# Patient Record
Sex: MALE | Race: WHITE | NOT HISPANIC OR LATINO | Employment: FULL TIME | URBAN - METROPOLITAN AREA
[De-identification: names, ages, dates, MRNs, and addresses within clinical notes are randomized per-mention and may not be internally consistent; named-entity substitution may affect disease eponyms.]

---

## 2017-03-11 ENCOUNTER — HOSPITAL ENCOUNTER (EMERGENCY)
Facility: HOSPITAL | Age: 47
Discharge: HOME | End: 2017-03-11
Attending: EMERGENCY MEDICINE | Admitting: EMERGENCY MEDICINE
Payer: COMMERCIAL

## 2017-03-11 VITALS
TEMPERATURE: 97.6 F | SYSTOLIC BLOOD PRESSURE: 211 MMHG | HEART RATE: 71 BPM | DIASTOLIC BLOOD PRESSURE: 98 MMHG | OXYGEN SATURATION: 100 % | RESPIRATION RATE: 16 BRPM | WEIGHT: 315 LBS

## 2017-03-11 DIAGNOSIS — R09.81 SINUS CONGESTION: Primary | ICD-10-CM

## 2017-03-11 PROCEDURE — 99283 EMERGENCY DEPT VISIT LOW MDM: CPT

## 2017-03-11 RX ORDER — FLUTICASONE PROPIONATE 50 MCG
1 SPRAY, SUSPENSION (ML) NASAL DAILY
Qty: 1 BOTTLE | Refills: 0 | Status: SHIPPED | OUTPATIENT
Start: 2017-03-11 | End: 2017-04-10

## 2017-03-11 RX ORDER — FEXOFENADINE HCL AND PSEUDOEPHEDRINE HCI 60; 120 MG/1; MG/1
1 TABLET, EXTENDED RELEASE ORAL EVERY 12 HOURS
Qty: 14 TABLET | Refills: 0 | Status: SHIPPED | OUTPATIENT
Start: 2017-03-11 | End: 2017-03-18

## 2017-03-11 RX ORDER — DIPHENHYDRAMINE HCL 25 MG
25 CAPSULE ORAL EVERY 6 HOURS PRN
Qty: 20 CAPSULE | Refills: 0 | Status: SHIPPED | OUTPATIENT
Start: 2017-03-11 | End: 2017-04-10

## 2020-11-21 ENCOUNTER — OFFICE VISIT (OUTPATIENT)
Dept: URGENT CARE | Age: 50
End: 2020-11-21
Payer: COMMERCIAL

## 2020-11-21 VITALS — TEMPERATURE: 98 F | HEART RATE: 108 BPM | OXYGEN SATURATION: 92 % | RESPIRATION RATE: 28 BRPM

## 2020-11-21 DIAGNOSIS — R06.02 SHORTNESS OF BREATH: Primary | ICD-10-CM

## 2020-11-21 PROCEDURE — U0003 INFECTIOUS AGENT DETECTION BY NUCLEIC ACID (DNA OR RNA); SEVERE ACUTE RESPIRATORY SYNDROME CORONAVIRUS 2 (SARS-COV-2) (CORONAVIRUS DISEASE [COVID-19]), AMPLIFIED PROBE TECHNIQUE, MAKING USE OF HIGH THROUGHPUT TECHNOLOGIES AS DESCRIBED BY CMS-2020-01-R: HCPCS | Performed by: NURSE PRACTITIONER

## 2020-11-21 PROCEDURE — G0382 LEV 3 HOSP TYPE B ED VISIT: HCPCS | Performed by: NURSE PRACTITIONER

## 2020-11-21 PROCEDURE — S9083 URGENT CARE CENTER GLOBAL: HCPCS | Performed by: NURSE PRACTITIONER

## 2020-11-21 RX ORDER — BENZONATATE 200 MG/1
200 CAPSULE ORAL 3 TIMES DAILY PRN
Qty: 20 CAPSULE | Refills: 0 | Status: SHIPPED | OUTPATIENT
Start: 2020-11-21

## 2020-11-21 RX ORDER — DEXAMETHASONE 4 MG/1
4 TABLET ORAL 2 TIMES DAILY WITH MEALS
Qty: 6 TABLET | Refills: 0 | Status: SHIPPED | OUTPATIENT
Start: 2020-11-21

## 2020-11-21 RX ORDER — ALBUTEROL SULFATE 90 UG/1
2 AEROSOL, METERED RESPIRATORY (INHALATION) EVERY 6 HOURS PRN
Qty: 8.5 G | Refills: 0 | Status: SHIPPED | OUTPATIENT
Start: 2020-11-21

## 2020-11-23 LAB — SARS-COV-2 RNA SPEC QL NAA+PROBE: DETECTED

## 2020-11-24 ENCOUNTER — TELEPHONE (OUTPATIENT)
Dept: URGENT CARE | Age: 50
End: 2020-11-24

## 2024-06-28 ENCOUNTER — HOSPITAL ENCOUNTER (INPATIENT)
Facility: HOSPITAL | Age: 54
LOS: 2 days | Discharge: HOME | DRG: 065 | End: 2024-06-30
Attending: EMERGENCY MEDICINE | Admitting: PSYCHIATRY & NEUROLOGY
Payer: COMMERCIAL

## 2024-06-28 ENCOUNTER — APPOINTMENT (EMERGENCY)
Dept: RADIOLOGY | Facility: HOSPITAL | Age: 54
DRG: 065 | End: 2024-06-28
Payer: COMMERCIAL

## 2024-06-28 DIAGNOSIS — I10 HYPERTENSION: ICD-10-CM

## 2024-06-28 DIAGNOSIS — E78.5 HYPERLIPIDEMIA: ICD-10-CM

## 2024-06-28 DIAGNOSIS — E11.9 T2DM (TYPE 2 DIABETES MELLITUS) (HCC): ICD-10-CM

## 2024-06-28 DIAGNOSIS — R53.1 WEAKNESS: ICD-10-CM

## 2024-06-28 DIAGNOSIS — G45.9 TIA (TRANSIENT ISCHEMIC ATTACK): ICD-10-CM

## 2024-06-28 DIAGNOSIS — I63.81 LACUNAR INFARCT, ACUTE (HCC): Primary | ICD-10-CM

## 2024-06-28 DIAGNOSIS — I65.29 CAROTID STENOSIS: ICD-10-CM

## 2024-06-28 LAB
ALBUMIN SERPL BCG-MCNC: 3.7 G/DL (ref 3.5–5)
ALP SERPL-CCNC: 45 U/L (ref 34–104)
ALT SERPL W P-5'-P-CCNC: 22 U/L (ref 7–52)
ANION GAP SERPL CALCULATED.3IONS-SCNC: 9 MMOL/L (ref 4–13)
AST SERPL W P-5'-P-CCNC: 26 U/L (ref 13–39)
BASOPHILS # BLD AUTO: 0.05 THOUSANDS/ÂΜL (ref 0–0.1)
BASOPHILS NFR BLD AUTO: 0 % (ref 0–1)
BILIRUB SERPL-MCNC: 0.28 MG/DL (ref 0.2–1)
BUN SERPL-MCNC: 21 MG/DL (ref 5–25)
CALCIUM SERPL-MCNC: 9.2 MG/DL (ref 8.4–10.2)
CHLORIDE SERPL-SCNC: 103 MMOL/L (ref 96–108)
CHOLEST SERPL-MCNC: 202 MG/DL
CO2 SERPL-SCNC: 25 MMOL/L (ref 21–32)
CREAT SERPL-MCNC: 1.31 MG/DL (ref 0.6–1.3)
EOSINOPHIL # BLD AUTO: 0.02 THOUSAND/ÂΜL (ref 0–0.61)
EOSINOPHIL NFR BLD AUTO: 0 % (ref 0–6)
ERYTHROCYTE [DISTWIDTH] IN BLOOD BY AUTOMATED COUNT: 13.2 % (ref 11.6–15.1)
EST. AVERAGE GLUCOSE BLD GHB EST-MCNC: 157 MG/DL
GFR SERPL CREATININE-BSD FRML MDRD: 61 ML/MIN/1.73SQ M
GLUCOSE SERPL-MCNC: 174 MG/DL (ref 65–140)
GLUCOSE SERPL-MCNC: 194 MG/DL (ref 65–140)
HBA1C MFR BLD: 7.1 %
HCT VFR BLD AUTO: 43.7 % (ref 36.5–49.3)
HDLC SERPL-MCNC: 43 MG/DL
HGB BLD-MCNC: 14.2 G/DL (ref 12–17)
IMM GRANULOCYTES # BLD AUTO: 0.15 THOUSAND/UL (ref 0–0.2)
IMM GRANULOCYTES NFR BLD AUTO: 1 % (ref 0–2)
LDLC SERPL CALC-MCNC: 134 MG/DL (ref 0–100)
LYMPHOCYTES # BLD AUTO: 4.22 THOUSANDS/ÂΜL (ref 0.6–4.47)
LYMPHOCYTES NFR BLD AUTO: 19 % (ref 14–44)
MCH RBC QN AUTO: 30.3 PG (ref 26.8–34.3)
MCHC RBC AUTO-ENTMCNC: 32.5 G/DL (ref 31.4–37.4)
MCV RBC AUTO: 93 FL (ref 82–98)
MONOCYTES # BLD AUTO: 1.48 THOUSAND/ÂΜL (ref 0.17–1.22)
MONOCYTES NFR BLD AUTO: 7 % (ref 4–12)
NEUTROPHILS # BLD AUTO: 16.69 THOUSANDS/ÂΜL (ref 1.85–7.62)
NEUTS SEG NFR BLD AUTO: 73 % (ref 43–75)
NRBC BLD AUTO-RTO: 0 /100 WBCS
PLATELET # BLD AUTO: 215 THOUSANDS/UL (ref 149–390)
PLATELET # BLD AUTO: 248 THOUSANDS/UL (ref 149–390)
PMV BLD AUTO: 10.6 FL (ref 8.9–12.7)
PMV BLD AUTO: 11.1 FL (ref 8.9–12.7)
POTASSIUM SERPL-SCNC: 4 MMOL/L (ref 3.5–5.3)
PROT SERPL-MCNC: 7.1 G/DL (ref 6.4–8.4)
RBC # BLD AUTO: 4.69 MILLION/UL (ref 3.88–5.62)
SODIUM SERPL-SCNC: 137 MMOL/L (ref 135–147)
TRIGL SERPL-MCNC: 124 MG/DL
WBC # BLD AUTO: 22.61 THOUSAND/UL (ref 4.31–10.16)

## 2024-06-28 PROCEDURE — 70498 CT ANGIOGRAPHY NECK: CPT

## 2024-06-28 PROCEDURE — 36415 COLL VENOUS BLD VENIPUNCTURE: CPT

## 2024-06-28 PROCEDURE — 80061 LIPID PANEL: CPT

## 2024-06-28 PROCEDURE — 70496 CT ANGIOGRAPHY HEAD: CPT

## 2024-06-28 PROCEDURE — 93005 ELECTROCARDIOGRAM TRACING: CPT

## 2024-06-28 PROCEDURE — 85049 AUTOMATED PLATELET COUNT: CPT

## 2024-06-28 PROCEDURE — 96360 HYDRATION IV INFUSION INIT: CPT

## 2024-06-28 PROCEDURE — 99285 EMERGENCY DEPT VISIT HI MDM: CPT | Performed by: EMERGENCY MEDICINE

## 2024-06-28 PROCEDURE — 83036 HEMOGLOBIN GLYCOSYLATED A1C: CPT

## 2024-06-28 PROCEDURE — 80053 COMPREHEN METABOLIC PANEL: CPT

## 2024-06-28 PROCEDURE — 82948 REAGENT STRIP/BLOOD GLUCOSE: CPT

## 2024-06-28 PROCEDURE — 99285 EMERGENCY DEPT VISIT HI MDM: CPT

## 2024-06-28 PROCEDURE — 85025 COMPLETE CBC W/AUTO DIFF WBC: CPT

## 2024-06-28 RX ORDER — ENOXAPARIN SODIUM 100 MG/ML
40 INJECTION SUBCUTANEOUS DAILY
Status: DISCONTINUED | OUTPATIENT
Start: 2024-06-29 | End: 2024-06-30 | Stop reason: HOSPADM

## 2024-06-28 RX ORDER — ATORVASTATIN CALCIUM 40 MG/1
40 TABLET, FILM COATED ORAL EVERY EVENING
Status: CANCELLED | OUTPATIENT
Start: 2024-06-28

## 2024-06-28 RX ORDER — ASPIRIN 81 MG/1
81 TABLET, CHEWABLE ORAL DAILY
Status: CANCELLED | OUTPATIENT
Start: 2024-06-29

## 2024-06-28 RX ORDER — DOCUSATE SODIUM 100 MG/1
100 CAPSULE, LIQUID FILLED ORAL 2 TIMES DAILY
Status: CANCELLED | OUTPATIENT
Start: 2024-06-28

## 2024-06-28 RX ORDER — ENOXAPARIN SODIUM 100 MG/ML
40 INJECTION SUBCUTANEOUS DAILY
Status: CANCELLED | OUTPATIENT
Start: 2024-06-29

## 2024-06-28 RX ORDER — ATORVASTATIN CALCIUM 40 MG/1
40 TABLET, FILM COATED ORAL EVERY EVENING
Status: DISCONTINUED | OUTPATIENT
Start: 2024-06-28 | End: 2024-06-30 | Stop reason: HOSPADM

## 2024-06-28 RX ORDER — CLOPIDOGREL BISULFATE 75 MG/1
75 TABLET ORAL DAILY
Status: DISCONTINUED | OUTPATIENT
Start: 2024-06-29 | End: 2024-06-30 | Stop reason: HOSPADM

## 2024-06-28 RX ORDER — CLOPIDOGREL BISULFATE 75 MG/1
300 TABLET ORAL ONCE
Status: COMPLETED | OUTPATIENT
Start: 2024-06-28 | End: 2024-06-28

## 2024-06-28 RX ORDER — ASPIRIN 81 MG/1
81 TABLET, CHEWABLE ORAL DAILY
Status: DISCONTINUED | OUTPATIENT
Start: 2024-06-29 | End: 2024-06-29

## 2024-06-28 RX ORDER — DOCUSATE SODIUM 100 MG/1
100 CAPSULE, LIQUID FILLED ORAL 2 TIMES DAILY
Status: DISCONTINUED | OUTPATIENT
Start: 2024-06-28 | End: 2024-06-30 | Stop reason: HOSPADM

## 2024-06-28 RX ORDER — ASPIRIN 325 MG
325 TABLET ORAL ONCE
Status: COMPLETED | OUTPATIENT
Start: 2024-06-28 | End: 2024-06-28

## 2024-06-28 RX ADMIN — IOHEXOL 85 ML: 350 INJECTION, SOLUTION INTRAVENOUS at 15:53

## 2024-06-28 RX ADMIN — ASPIRIN 325 MG ORAL TABLET 325 MG: 325 PILL ORAL at 17:15

## 2024-06-28 RX ADMIN — SODIUM CHLORIDE 1000 ML: 0.9 INJECTION, SOLUTION INTRAVENOUS at 16:12

## 2024-06-28 RX ADMIN — ATORVASTATIN CALCIUM 40 MG: 40 TABLET, FILM COATED ORAL at 20:22

## 2024-06-28 RX ADMIN — CLOPIDOGREL BISULFATE 300 MG: 75 TABLET ORAL at 20:22

## 2024-06-28 RX ADMIN — DOCUSATE SODIUM 100 MG: 100 CAPSULE, LIQUID FILLED ORAL at 20:22

## 2024-06-28 NOTE — QUICK NOTE
NEUROLOGY RESIDENCY   ADMISSION  QUICK NOTE   Name: Son Lafleur Age: 54 y.o. Sex:male  MRN: [id number]   Encounter: [id number] Length of Stay: 0    Patient seen and examined by the overnight resident for admission. Case was discussed with on-call Neurologist, Dr. Jeff, who is in agreement with the plan of care. Preliminary recommendations are as outlined below. Formal H&P to follow.     Recommendations for outpatient neurological follow up have yet to be determined.   Pending for discharge: Stroke Work Up    Assessment & Plan    ASSESSMENT & PLAN   # TRANSIENT NEUROLOGICAL DEFICITS  54-year-old male presenting after a transient episode of right-sided weakness and speech disturbance with total duration of 10 minutes. Upon arrival patient was asymptomatic with initial NIHSS of 0. Found to have an elevated initial BP of 169/141. Imaging consisting of CTA H/N revealed subacute to chronic right caudate and Td caudate white matter lacunar infarcts, mild cervical carotid atherosclerotic disease, severe tapering of the left supraclinoid ICA and markedly small carotid terminus as well as proximal CLAUDIA and MCA branches, severe focal stenosis at origin of the dominant right A1 segment, mild stenosis in the proximal right M1 segment, R >L PCA stenosis, and congenital and acquired cervical spinal stenosis with severe stenosis in the lower cervical spine.   - Home Antiplatelet or Anticoagulants PTA:  None  - Stroke risk factors: HTN and Over weight  - Prior stroke history: none. Past Neurological History: no neurological problems    Exam on 06/28/24: No focal deficits  BP over last 24 hours: Blood Pressure: 112/95  current BP: Blood Pressure: 112/95    Pertinent scores:  Initial NIHSS: 0  ABCD2 Score: 4 (BP, Unilateral Weakness, Duration of sxs)  Modified [place] Score: 0 (No baseline symptoms/disability)    Impression: Transient right-sided weakness and speech disturbances with a presentation consistent with TIA and an ABCD2  score of 4,which corresponds to a 9.8% 90-day stroke risk. For secondary stroke prevention, reasonable to treat with DAPT x21 days per CHANCE/POINT trials and high intensity statin.    Plan: Discussed plan with neurology attending, Dr. Jeff  Admivet along the stroke/TIA pathway with telemetry monitoring  Frequent neuro checks per protocol. If there is any acute changes in exam, please obtain stat CT head and notify neurology team  BP Goals: Normotension of <130/80  Labs: Hgb A1c, LDL   Brain Imaging: Obtain MRI Brain without contrast  TTE, pending  Antiplatelet agents:Load  mg, then 81 mg. Load Plavix 300  mg, then 75 mg. DAPT for 21 days- continue ASA 81mg indefinitely.  Statin: Will start atorvastin 40 mg daily  Euthermic/Euglycemic  DVT PPx: Lovenox, SCDs  Consults: Speech, PT/OT, PM&R and CM, input appreciated  Stroke education and counseling  Rest of other care per primary team appreciated      Subjective   CHIEF COMPLAINT     Chief Complaint   Patient presents with    Extremity Weakness     Patient reports eating lunch today and mid bite could not use right side of his body. Patient reports being unable to verbalize to coworkers what was going on. Boss stated patient had slurred speech. Patient symptoms resolved at this time.      HISTORY OF PRESENT ILLNESS   Hx/PE Limited By: None  HPI: 54-year-old male presenting after an episode of transient right-sided weakness. Patient states while at work he sat down to have lunch where he began experiencing weakness of the right arm and right leg (arm >leg). Also noted associated speech disturbance described as slurred speech and difficulty getting his words out. States that symptoms began abruptly. Total duration of 10 minutes. States that after episode completely returned back to his baseline. This is never happened in the past before. Denies any other associated symptoms.  REVIEW OF SYSTEMS   ROS: See HPI for details  HISTORICAL INFORMATION   Review of previous  "medical records was completed.   Past Medical History:  has no past medical history on file.   Allergies: Patient has no known allergies.  Past Surgical History:  has a past surgical history that includes Skin graft and Shoulder surgery.  Family History: family history is not on file.  Social History:  reports that he has never smoked. He has never used smokeless tobacco. He reports current alcohol use. He reports that he does not use drugs.     Objective   OBJECTIVE   Patient ID: Son Lafleur is a 54 y.o. male.  Blood pressure 112/95, pulse 75, temperature 97.9 °F (36.6 °C), temperature source Oral, resp. rate 17, height 5' 10\" (1.778 m), weight (!) 152 kg (335 lb), SpO2 98%.   GENERAL EXAM:  Constitutional: Not in acute distress. Not ill-appearing, toxic-appearing or diaphoretic.   HENT: Normocephalic and atraumatic. Nose and Ears normal.   Eyes: No scleral icterus. No discharge.   Cardiovascular:  Distal extremities warm without palpable edema or tenderness, no observed significant swelling.   Pulmonary: Pulmonary effort is normal. Not in respiratory distress  Musculoskeletal: No swelling or deformity.  Psychiatric: Normal behavior and appropriate affect     NEUROLOGIC  EXAM:  Mental Status: Alertness: alert, Orientation: time, date, person, place, city, president, Speech/Language fluent, clear, coherent, Commands: Can follow multistep commands  Cranial Nerves:  I Not tested   II Visual Fields normal   II, Ill,  IV, VI Pupils equal, round, reactive to light and accommodation  EOM full and intact   V facial sensation was normal and symmetrical   VII facial symmetry equal   VIII Normal hearing to speech   IX, X Normal Palatal Elevation, No Uvular Deviation   XI Shoulder shrug and head turn is normal   XII Midline tongue protrusion   Motor: No pronator drift, No atrophy or muscle wasting, Normal tone, No Involuntary Movements, and No tremors appreciated  Arm Right  Left Leg Right  Left   Deltoid 5/5 5/5 lliopsoas " "5/5 5/5   Biceps 5/5 5/5 Quads 5/5 5/5   Triceps 5/5 5/5 Hamstrings 5/5 5/5   Wrist Extension 5/5 5/5 Ankle Dorsi Flexion 5/5 5/5   Wrist Flexion 5/5 5/5 Ankle Plantar Flexion 5/5 5/5   Reflexes: No clonus, no Andino's, no cross abductors, toes down     BICEP   TRICEPS   BRACHIO   PATELLAR   ACHILLES   RIGHT 2+ 2+ 2+ 2+ 2+   LEFT 2+ 2+ 2+ 2+ 2+   Sensory: Normal sensation to light touch, pinprick, vibration, temperature, and proprioception in all limbs, romberg negative  Coordination: Cerebellar arm drift absent. Finger To Nose: Right Intact, Left Intact. Heel To Shin: Right Intact, Left Intact  Station/Gait: Deferred d/t medical severity    LABORATORY DATA     Labs: I have personally reviewed pertinent labs.    Results from last 7 days   Lab Units 06/28/24 2035 06/28/24  1456   WBC Thousand/uL  --  22.61*   HEMOGLOBIN g/dL  --  14.2   HEMATOCRIT %  --  43.7   PLATELETS Thousands/uL 215 248   SEGS PCT %  --  73   LYMPHO PCT %  --  19   MONO PCT %  --  7   EOS PCT %  --  0     Results from last 7 days   Lab Units 06/28/24  1456   SODIUM mmol/L 137   POTASSIUM mmol/L 4.0   CHLORIDE mmol/L 103   CO2 mmol/L 25   BUN mg/dL 21   CREATININE mg/dL 1.31*   ANION GAP mmol/L 9   CALCIUM mg/dL 9.2   ALBUMIN g/dL 3.7   TOTAL BILIRUBIN mg/dL 0.28   ALK PHOS U/L 45   ALT U/L 22   AST U/L 26   GLUCOSE RANDOM mg/dL 174*     Results from last 7 days   Lab Units 06/28/24  1415   POC GLUCOSE mg/dl 194*                 Microbiology: I have reviewed pertinent results.      No results found for: \"BLOODCX\", \"URINECX\", \"WOUNDCULT\", \"SPUTUMCULTUR\"    IMAGING STUDIES    Radiology Results: I have personally reviewed pertinent reports and reviewed films in PACS  CTA head and neck with and without contrast    Result Date: 6/28/2024  Impression: BRAIN: No acute intracranial hemorrhage or cortical infarction. Subacute to chronic right caudate and pericaudate white matter lacunar infarcts. CTA NECK: Mild cervical carotid atherosclerotic disease. " "No hemodynamically significant stenosis at the carotid bifurcations. CTA HEAD: Severe tapering of the left supraclinoid ICA and markedly small carotid terminus as well as proximal CLAUDIA and MCA branches. Severe focal stenosis at the origin of the dominant right A1 segment.. Mild stenosis in the proximal right M1 segment. Right greater than left PCA stenosis. MISCELLANEOUS: Congenital and acquired cervical spinal stenosis with severe stenosis in the lower cervical spine. Nonemergent follow-up MRI imaging is recommended. Workstation performed: [id number]         MEDICATIONS     Medications Prior to Admission:     albuterol (ProAir HFA) 90 mcg/act inhaler    benzonatate (TESSALON) 200 MG capsule    dexamethasone (DECADRON) 4 mg tablet    diphenhydrAMINE (BENADRYL) 25 mg capsule    fluticasone (FLONASE) 50 mcg/act nasal spray     Scheduled PRN   [START ON 6/29/2024] aspirin, 81 mg, Daily  atorvastatin, 40 mg, QPM  [START ON 6/29/2024] clopidogrel, 75 mg, Daily  docusate sodium, 100 mg, BID  [START ON 6/29/2024] enoxaparin, 40 mg, Daily         Continuous            VTE Mechanical Prophylaxis: Sequential Compression Device  VTE Pharmacologic Prophylaxis: VTE covered by:  [START ON 6/29/2024] enoxaparin, Subcutaneous      ====================================================================================  Code Status: Level 1 - Full Code  Advance Directive and Living Will:      Power of :    POLST:    =======================================================================  Dave Russ,    [facility]'s Neurology Residency, PGY-3    Portions of the record may have been created with voice recognition software.  Occasional wrong word or \"sound a like\" substitutions may have occurred due to the inherent limitations of voice recognition software.  Read the chart carefully and recognize, using context, where substitutions have occurred.   "

## 2024-06-28 NOTE — LETTER
[facility] [address]  [address] [address]  Dept: [phone]    June 30, 2024     Patient: Son Lafleur   YOB: 1970   Date of Visit: 6/28/2024       To Whom it May Concern:    Son Lafleur is under my professional care. He was seen in the hospital from 6/28/2024 to 06/30/24. He may return to work on 7/2/2024 without limitations.    If you have any questions or concerns, please don't hesitate to call.         Sincerely,        Salud Monteiro, DO

## 2024-06-28 NOTE — PLAN OF CARE
Problem: Neurological Deficit  Goal: Neurological status is stable or improving  Description: Interventions:  - Monitor and assess patient's level of consciousness, motor function, sensory function, and level of assistance needed for ADLs.   - Monitor and report changes from baseline. Collaborate with interdisciplinary team to initiate plan and implement interventions as ordered.   - Provide and maintain a safe environment.  - Consider seizure precautions.  - Consider fall precautions.  - Consider aspiration precautions.  - Consider bleeding precautions.  Outcome: Progressing     Problem: Activity Intolerance/Impaired Mobility  Goal: Mobility/activity is maintained at optimum level for patient  Description: Interventions:  - Assess and monitor patient  barriers to mobility and need for assistive/adaptive devices.  - Assess patient's emotional response to limitations.  - Collaborate with interdisciplinary team and initiate plans and interventions as ordered.  - Encourage independent activity per ability.  - Maintain proper body alignment.  - Perform active/passive rom as tolerated/ordered.  - Plan activities to conserve energy.  - Turn patient as appropriate  Outcome: Progressing     Problem: Communication Impairment  Goal: Ability to express needs and understand communication  Description: Assess patient's communication skills and ability to understand information.  Patient will demonstrate use of effective communication techniques, alternative methods of communication and understanding even if not able to speak.     - Encourage communication and provide alternate methods of communication as needed.  - Collaborate with case management/ for discharge needs.  - Include patient/family/caregiver in decisions related to communication.  Outcome: Progressing     Problem: Potential for Aspiration  Goal: Non-ventilated patient's risk of aspiration is minimized  Description: Assess and monitor vital signs,  respiratory status, and labs (WBC).  Monitor for signs of aspiration (tachypnea, cough, rales, wheezing, cyanosis, fever).    - Assess and monitor patient's ability to swallow.  - Place patient up in chair to eat if possible.  - HOB up at 90 degrees to eat if unable to get patient up into chair.  - Supervise patient during oral intake.   - Instruct patient/ family to take small bites.  - Instruct patient/ family to take small single sips when taking liquids.  - Follow patient-specific strategies generated by speech pathologist.  Outcome: Progressing  Goal: Ventilated patient's risk of aspiration is minimized  Description: Assess and monitor vital signs, respiratory status, airway cuff pressure, and labs (WBC).  Monitor for signs of aspiration (tachypnea, cough, rales, wheezing, cyanosis, fever).    - Elevate head of bed 30 degrees if patient has tube feeding.  - Monitor tube feeding.  Outcome: Progressing     Problem: Nutrition  Goal: Nutrition/Hydration status is improving  Description: Monitor and assess patient's nutrition/hydration status for malnutrition (ex- brittle hair, bruises, dry skin, pale skin and conjunctiva, muscle wasting, smooth red tongue, and disorientation). Collaborate with interdisciplinary team and initiate plan and interventions as ordered.  Monitor patient's weight and dietary intake as ordered or per policy. Utilize nutrition screening tool and intervene per policy. Determine patient's food preferences and provide high-protein, high-caloric foods as appropriate.     - Assist patient with eating.  - Allow adequate time for meals.  - Encourage patient to take dietary supplement as ordered.  - Collaborate with clinical nutritionist.  - Include patient/family/caregiver in decisions related to nutrition.  Outcome: Progressing     Problem: PAIN - ADULT  Goal: Verbalizes/displays adequate comfort level or baseline comfort level  Description: Interventions:  - Encourage patient to monitor pain and  request assistance  - Assess pain using appropriate pain scale  - Administer analgesics based on type and severity of pain and evaluate response  - Implement non-pharmacological measures as appropriate and evaluate response  - Consider cultural and social influences on pain and pain management  - Notify physician/advanced practitioner if interventions unsuccessful or patient reports new pain  Outcome: Progressing     Problem: INFECTION - ADULT  Goal: Absence or prevention of progression during hospitalization  Description: INTERVENTIONS:  - Assess and monitor for signs and symptoms of infection  - Monitor lab/diagnostic results  - Monitor all insertion sites, i.e. indwelling lines, tubes, and drains  - Monitor endotracheal if appropriate and nasal secretions for changes in amount and color  - [place] appropriate cooling/warming therapies per order  - Administer medications as ordered  - Instruct and encourage patient and family to use good hand hygiene technique  - Identify and instruct in appropriate isolation precautions for identified infection/condition  Outcome: Progressing  Goal: Absence of fever/infection during neutropenic period  Description: INTERVENTIONS:  - Monitor WBC    Outcome: Progressing     Problem: SAFETY ADULT  Goal: Patient will remain free of falls  Description: INTERVENTIONS:  - Educate patient/family on patient safety including physical limitations  - Instruct patient to call for assistance with activity   - Consult OT/PT to assist with strengthening/mobility   - Keep Call bell within reach  - Keep bed low and locked with side rails adjusted as appropriate  - Keep care items and personal belongings within reach  - Initiate and maintain comfort rounds  - Make Fall Risk Sign visible to staff  - Offer Toileting every 2 Hours, in advance of need  - Initiate/Maintain bed alarm  - Obtain necessary fall risk management equipment: bed alarm   - Apply yellow socks and bracelet for high fall risk  patients  - Consider moving patient to room near nurses station  Outcome: Progressing  Goal: Maintain or return to baseline ADL function  Description: INTERVENTIONS:  -  Assess patient's ability to carry out ADLs; assess patient's baseline for ADL function and identify physical deficits which impact ability to perform ADLs (bathing, care of mouth/teeth, toileting, grooming, dressing, etc.)  - Assess/evaluate cause of self-care deficits   - Assess range of motion  - Assess patient's mobility; develop plan if impaired  - Assess patient's need for assistive devices and provide as appropriate  - Encourage maximum independence but intervene and supervise when necessary  - Involve family in performance of ADLs  - Assess for home care needs following discharge   - Consider OT consult to assist with ADL evaluation and planning for discharge  - Provide patient education as appropriate  Outcome: Progressing  Goal: Maintains/Returns to pre admission functional level  Description: INTERVENTIONS:  - Perform AM-PAC 6 Click Basic Mobility/ Daily Activity assessment daily.  - Set and communicate daily mobility goal to care team and patient/family/caregiver.   - Collaborate with rehabilitation services on mobility goals if consulted  - Perform Range of Motion 3 times a day.  - Reposition patient every 2 hours.  - Dangle patient 3 times a day  - Stand patient 3 times a day  - Ambulate patient 3 times a day  - Out of bed to chair 3 times a day   - Out of bed for meals 3 times a day  - Out of bed for toileting  - Record patient progress and toleration of activity level   Outcome: Progressing     Problem: DISCHARGE PLANNING  Goal: Discharge to home or other facility with appropriate resources  Description: INTERVENTIONS:  - Identify barriers to discharge w/patient and caregiver  - Arrange for needed discharge resources and transportation as appropriate  - Identify discharge learning needs (meds, wound care, etc.)  - Arrange for  interpretive services to assist at discharge as needed  - Refer to Case Management Department for coordinating discharge planning if the patient needs post-hospital services based on physician/advanced practitioner order or complex needs related to functional status, cognitive ability, or social support system  Outcome: Progressing     Problem: Knowledge Deficit  Goal: Patient/family/caregiver demonstrates understanding of disease process, treatment plan, medications, and discharge instructions  Description: Complete learning assessment and assess knowledge base.  Interventions:  - Provide teaching at level of understanding  - Provide teaching via preferred learning methods  Outcome: Progressing     Problem: Nutrition/Hydration-ADULT  Goal: Nutrient/Hydration intake appropriate for improving, restoring or maintaining nutritional needs  Description: Monitor and assess patient's nutrition/hydration status for malnutrition. Collaborate with interdisciplinary team and initiate plan and interventions as ordered.  Monitor patient's weight and dietary intake as ordered or per policy. Utilize nutrition screening tool and intervene as necessary. Determine patient's food preferences and provide high-protein, high-caloric foods as appropriate.     INTERVENTIONS:  - Monitor oral intake, urinary output, labs, and treatment plans  - Assess nutrition and hydration status and recommend course of action  - Evaluate amount of meals eaten  - Assist patient with eating if necessary   - Allow adequate time for meals  - Recommend/ encourage appropriate diets, oral nutritional supplements, and vitamin/mineral supplements  - Order, calculate, and assess calorie counts as needed  - Recommend, monitor, and adjust tube feedings and TPN/PPN based on assessed needs  - Assess need for intravenous fluids  - Provide specific nutrition/hydration education as appropriate  - Include patient/family/caregiver in decisions related to nutrition  Outcome:  Progressing     Problem: NEUROSENSORY - ADULT  Goal: Achieves stable or improved neurological status  Description: INTERVENTIONS  - Monitor and report changes in neurological status  - Monitor vital signs such as temperature, blood pressure, glucose, and any other labs ordered   - Initiate measures to prevent increased intracranial pressure  - Monitor for seizure activity and implement precautions if appropriate      Outcome: Progressing     Problem: MUSCULOSKELETAL - ADULT  Goal: Maintain proper alignment of affected body part  Description: INTERVENTIONS:  - Support, maintain and protect limb and body alignment  - Provide patient/ family with appropriate education  Outcome: Progressing

## 2024-06-28 NOTE — ED PROVIDER NOTES
History  Chief Complaint   Patient presents with    Extremity Weakness     Patient reports eating lunch today and mid bite could not use right side of his body. Patient reports being unable to verbalize to coworkers what was going on. Boss stated patient had slurred speech. Patient symptoms resolved at this time.      54-year-old male presents emergency department complaining of right arm weakness that spontaneously resolved prior to arrival.  He states while at work today he did drop objects because his right arm was not working.  He did have an episode of dysarthria.  The symptoms are 100% resolved prior to the emergency department as a result stroke alert was not called.        Prior to Admission Medications   Prescriptions Last Dose Informant Patient Reported? Taking?   albuterol (ProAir HFA) 90 mcg/act inhaler Not Taking  No No   Sig: Inhale 2 puffs every 6 (six) hours as needed for shortness of breath   Patient not taking: Reported on 2024   benzonatate (TESSALON) 200 MG capsule Not Taking  No No   Sig: Take 1 capsule (200 mg total) by mouth 3 (three) times a day as needed for cough   Patient not taking: Reported on 2024   dexamethasone (DECADRON) 4 mg tablet Not Taking  No No   Sig: Take 1 tablet (4 mg total) by mouth 2 (two) times a day with meals   Patient not taking: Reported on 2024   diphenhydrAMINE (BENADRYL) 25 mg capsule   No No   Sig: Take 1 capsule by mouth every 6 (six) hours as needed for itching, allergies or sleep for up to 30 days   fluticasone (FLONASE) 50 mcg/act nasal spray   No No   Si spray into each nostril daily for 30 days      Facility-Administered Medications: None       History reviewed. No pertinent past medical history.    Past Surgical History:   Procedure Laterality Date    SHOULDER SURGERY      SKIN GRAFT         History reviewed. No pertinent family history.  I have reviewed and agree with the history as documented.    E-Cigarette/Vaping      E-Cigarette/Vaping Substances     Social History     Tobacco Use    Smoking status: Never    Smokeless tobacco: Never   Substance Use Topics    Alcohol use: Yes     Comment: rarely    Drug use: No        Review of Systems   Neurological:  Positive for weakness.   All other systems reviewed and are negative.      Physical Exam  ED Triage Vitals   Temperature Pulse Respirations Blood Pressure SpO2   06/28/24 2134 06/28/24 1408 06/28/24 1408 06/28/24 1408 06/28/24 1408   97.9 °F (36.6 °C) 73 17 (!) 169/141 98 %      Temp Source Heart Rate Source Patient Position - Orthostatic VS BP Location FiO2 (%)   06/28/24 1811 06/28/24 1408 06/28/24 1408 06/28/24 1408 --   Oral Monitor Lying Right arm       Pain Score       06/28/24 1811       No Pain             Orthostatic Vital Signs  Vitals:    06/28/24 1408 06/28/24 1430 06/28/24 1600 06/28/24 2134   BP: (!) 169/141 169/78 153/73 112/95   Pulse: 73 76 86 75   Patient Position - Orthostatic VS: Lying   Lying       Physical Exam  Vitals and nursing note reviewed.   Constitutional:       General: He is not in acute distress.     Appearance: He is well-developed.   HENT:      Head: Normocephalic and atraumatic.   Eyes:      Conjunctiva/sclera: Conjunctivae normal.   Cardiovascular:      Rate and Rhythm: Normal rate and regular rhythm.      Heart sounds: No murmur heard.  Pulmonary:      Effort: Pulmonary effort is normal. No respiratory distress.      Breath sounds: Normal breath sounds.   Abdominal:      Palpations: Abdomen is soft.      Tenderness: There is no abdominal tenderness.   Musculoskeletal:         General: No swelling.      Cervical back: Neck supple.   Skin:     General: Skin is warm and dry.      Capillary Refill: Capillary refill takes less than 2 seconds.   Neurological:      General: No focal deficit present.      Mental Status: He is alert and oriented to person, place, and time. Mental status is at baseline.      Cranial Nerves: No cranial nerve deficit.       Sensory: No sensory deficit.      Motor: No weakness.      Coordination: Coordination normal.      Gait: Gait normal.   Psychiatric:         Mood and Affect: Mood normal.         ED Medications  Medications   docusate sodium (COLACE) capsule 100 mg (100 mg Oral Given 6/28/24 2022)   atorvastatin (LIPITOR) tablet 40 mg (40 mg Oral Given 6/28/24 2022)   enoxaparin (LOVENOX) subcutaneous injection 40 mg (has no administration in time range)   clopidogrel (PLAVIX) tablet 75 mg (has no administration in time range)   aspirin chewable tablet 81 mg (has no administration in time range)   sodium chloride 0.9 % bolus 1,000 mL (1,000 mL Intravenous New Bag 6/28/24 1612)   iohexol (OMNIPAQUE) 350 MG/ML injection (SINGLE-DOSE) 85 mL (85 mL Intravenous Given 6/28/24 1553)   aspirin tablet 325 mg (325 mg Oral Given 6/28/24 1715)   clopidogrel (PLAVIX) tablet 300 mg (300 mg Oral Given 6/28/24 2022)       Diagnostic Studies  Results Reviewed       Procedure Component Value Units Date/Time    Hemoglobin A1c w/EAG Estimation [[id number]]  (Abnormal) Collected: 06/28/24 1456    Lab Status: Final result Specimen: Blood from Arm, Left Updated: 06/28/24 2321     Hemoglobin A1C 7.1 %       mg/dl     Lipid Panel with Direct LDL reflex [[id number]]  (Abnormal) Collected: 06/28/24 1456    Lab Status: Final result Specimen: Blood from Arm, Left Updated: 06/28/24 1913     Cholesterol 202 mg/dL      Triglycerides 124 mg/dL      HDL, Direct 43 mg/dL      LDL Calculated 134 mg/dL     Comprehensive metabolic panel [[id number]]  (Abnormal) Collected: 06/28/24 1456    Lab Status: Final result Specimen: Blood from Arm, Left Updated: 06/28/24 1531     Sodium 137 mmol/L      Potassium 4.0 mmol/L      Chloride 103 mmol/L      CO2 25 mmol/L      ANION GAP 9 mmol/L      BUN 21 mg/dL      Creatinine 1.31 mg/dL      Glucose 174 mg/dL      Calcium 9.2 mg/dL      AST 26 U/L      ALT 22 U/L      Alkaline Phosphatase 45 U/L      Total Protein 7.1 g/dL       Albumin 3.7 g/dL      Total Bilirubin 0.28 mg/dL      eGFR 61 ml/min/1.73sq m     Narrative:      [employer] guidelines for Chronic Kidney Disease (CKD):     Stage 1 with normal or high GFR (GFR > 90 mL/min/1.73 square meters)    Stage 2 Mild CKD (GFR = 60-89 mL/min/1.73 square meters)    Stage 3A Moderate CKD (GFR = 45-59 mL/min/1.73 square meters)    Stage 3B Moderate CKD (GFR = 30-44 mL/min/1.73 square meters)    Stage 4 Severe CKD (GFR = 15-29 mL/min/1.73 square meters)    Stage 5 End Stage CKD (GFR <15 mL/min/1.73 square meters)  Note: GFR calculation is accurate only with a steady state creatinine    CBC and differential [[id number]]  (Abnormal) Collected: 06/28/24 1456    Lab Status: Final result Specimen: Blood from Arm, Left Updated: 06/28/24 1509     WBC 22.61 Thousand/uL      RBC 4.69 Million/uL      Hemoglobin 14.2 g/dL      Hematocrit 43.7 %      MCV 93 fL      MCH 30.3 pg      MCHC 32.5 g/dL      RDW 13.2 %      MPV 11.1 fL      Platelets 248 Thousands/uL      nRBC 0 /100 WBCs      Segmented % 73 %      Immature Grans % 1 %      Lymphocytes % 19 %      Monocytes % 7 %      Eosinophils Relative 0 %      Basophils Relative 0 %      Absolute Neutrophils 16.69 Thousands/µL      Absolute Immature Grans 0.15 Thousand/uL      Absolute Lymphocytes 4.22 Thousands/µL      Absolute Monocytes 1.48 Thousand/µL      Eosinophils Absolute 0.02 Thousand/µL      Basophils Absolute 0.05 Thousands/µL     Fingerstick Glucose (POCT) [[id number]]  (Abnormal) Collected: 06/28/24 1415    Lab Status: Final result Specimen: Blood Updated: 06/28/24 1416     POC Glucose 194 mg/dl                    CTA head and neck with and without contrast   Final Result by Pallav N Shah, MD (06/28 1632)      BRAIN: No acute intracranial hemorrhage or cortical infarction. Subacute to chronic right caudate and pericaudate white matter lacunar infarcts.      CTA NECK: Mild cervical carotid atherosclerotic disease. No  hemodynamically significant stenosis at the carotid bifurcations.      CTA HEAD: Severe tapering of the left supraclinoid ICA and markedly small carotid terminus as well as proximal CLAUDIA and MCA branches. Severe focal stenosis at the origin of the dominant right A1 segment.. Mild stenosis in the proximal right M1 segment.    Right greater than left PCA stenosis.      MISCELLANEOUS: Congenital and acquired cervical spinal stenosis with severe stenosis in the lower cervical spine. Nonemergent follow-up MRI imaging is recommended.                              Workstation performed: [id number]         MRI Inpatient Order    (Results Pending)         Procedures  ECG 12 Lead Documentation Only    Date/Time: 6/28/2024 3:25 PM    Performed by: Christopher Thomas DO  Authorized by: Christopher Thomas DO    Indications / Diagnosis:  Stroke pathway  ECG reviewed by me, the ED Provider: yes    Patient location:  ED  Previous ECG:     Previous ECG:  Unavailable    Comparison to cardiac monitor: Yes    Interpretation:     Interpretation: normal    Rate:     ECG rate:  81    ECG rate assessment: normal    Rhythm:     Rhythm: sinus rhythm    Ectopy:     Ectopy: none    QRS:     QRS axis:  Normal    QRS intervals:  Normal  Conduction:     Conduction: normal    ST segments:     ST segments:  Normal  T waves:     T waves: normal          ED Course  ED Course as of 06/28/24 2331   Fri Jun 28, 2024   1442 R arm weakness and dysarthria that resolved prior to arrival. Happened about 30 minutes PTA    1536 Comprehensive metabolic panel(!)  Will order fluids. Pending CT scan    1649 CTA head and neck with and without contrast  IMPRESSION:     BRAIN: No acute intracranial hemorrhage or cortical infarction. Subacute to chronic right caudate and pericaudate white matter lacunar infarcts.     CTA NECK: Mild cervical carotid atherosclerotic disease. No hemodynamically significant stenosis at the carotid bifurcations.     CTA HEAD: Severe tapering of  the left supraclinoid ICA and markedly small carotid terminus as well as proximal CLAUDIA and MCA branches. Severe focal stenosis at the origin of the dominant right A1 segment.. Mild stenosis in the proximal right M1 segment.   Right greater than left PCA stenosis.     MISCELLANEOUS: Congenital and acquired cervical spinal stenosis with severe stenosis in the lower cervical spine. Nonemergent follow-up MRI imaging is recommended.                             Workstation performed: [id number]                                         Medical Decision Making  54-year-old male presents to the emergency department with presentation concerning for transient ischemic attack.  Given overall resolution of symptoms.,  Stroke alert will not be initiated however stroke workup will be performed in the emergency department.  Patient was informed he will be admitted for inpatient stroke workup as well as MRI.  CT scan was concerning for numerous tapering narrowing of multiple vessels.  Will receive aspirin and admission to neurology service.    Amount and/or Complexity of Data Reviewed  Labs: ordered. Decision-making details documented in ED Course.  Radiology: ordered. Decision-making details documented in ED Course.    Risk  OTC drugs.  Prescription drug management.  Decision regarding hospitalization.          Disposition  Final diagnoses:   Weakness   TIA (transient ischemic attack)   Lacunar infarct, acute (HCC)   Carotid stenosis     Time reflects when diagnosis was documented in both MDM as applicable and the Disposition within this note       Time User Action Codes Description Comment    6/28/2024  4:48 PM Christopher Thomas [R53.1] Weakness     6/28/2024  4:48 PM Christopher Thomas [G45.9] TIA (transient ischemic attack)     6/28/2024  4:48 PM Christopher Thomas [I63.81] Lacunar infarct, acute (HCC)     6/28/2024  4:49 PM Christopher Thomas [I65.29] Carotid stenosis     6/28/2024  5:36 PM Christopher Thomas Modify [R53.1]  Weakness     6/28/2024  5:36 PM Christopher Thomas Modify [G45.9] TIA (transient ischemic attack)           ED Disposition       ED Disposition   Admit    Condition   Stable    Date/Time   Fri Jun 28, 2024  5:29 PM    Comment   Case was discussed with Dr. Lake and the patient's admission status was agreed to be Admission Status: observation status to the service of Dr. Lake   .               Follow-up Information    None         Current Discharge Medication List        CONTINUE these medications which have NOT CHANGED    Details   albuterol (ProAir HFA) 90 mcg/act inhaler Inhale 2 puffs every 6 (six) hours as needed for shortness of breath  Qty: 8.5 g, Refills: 0    Comments: Substitution to a formulary equivalent within the same pharmaceutical class is authorized.  Associated Diagnoses: Shortness of breath      benzonatate (TESSALON) 200 MG capsule Take 1 capsule (200 mg total) by mouth 3 (three) times a day as needed for cough  Qty: 20 capsule, Refills: 0    Associated Diagnoses: Shortness of breath      dexamethasone (DECADRON) 4 mg tablet Take 1 tablet (4 mg total) by mouth 2 (two) times a day with meals  Qty: 6 tablet, Refills: 0    Associated Diagnoses: Shortness of breath      diphenhydrAMINE (BENADRYL) 25 mg capsule Take 1 capsule by mouth every 6 (six) hours as needed for itching, allergies or sleep for up to 30 days  Qty: 20 capsule, Refills: 0      fluticasone (FLONASE) 50 mcg/act nasal spray 1 spray into each nostril daily for 30 days  Qty: 1 Bottle, Refills: 0           No discharge procedures on file.    PDMP Review       None             ED Provider  Attending physically available and evaluated Son Lafleur. I managed the patient along with the ED Attending.    Electronically Signed by           Christopher Thomas DO  06/28/24 2835

## 2024-06-29 ENCOUNTER — APPOINTMENT (OUTPATIENT)
Dept: RADIOLOGY | Facility: HOSPITAL | Age: 54
DRG: 065 | End: 2024-06-29
Payer: COMMERCIAL

## 2024-06-29 PROBLEM — I10 HYPERTENSION: Status: ACTIVE | Noted: 2024-06-29

## 2024-06-29 PROBLEM — E11.9 T2DM (TYPE 2 DIABETES MELLITUS) (HCC): Status: ACTIVE | Noted: 2024-06-29

## 2024-06-29 PROBLEM — I63.9 STROKE (CEREBRUM) (HCC): Status: ACTIVE | Noted: 2024-06-29

## 2024-06-29 PROBLEM — E78.5 HYPERLIPIDEMIA: Status: ACTIVE | Noted: 2024-06-29

## 2024-06-29 LAB
ALBUMIN SERPL BCG-MCNC: 3.5 G/DL (ref 3.5–5)
ALP SERPL-CCNC: 35 U/L (ref 34–104)
ALT SERPL W P-5'-P-CCNC: 22 U/L (ref 7–52)
ANION GAP SERPL CALCULATED.3IONS-SCNC: 10 MMOL/L (ref 4–13)
AST SERPL W P-5'-P-CCNC: 23 U/L (ref 13–39)
BASOPHILS # BLD MANUAL: 0.14 THOUSAND/UL (ref 0–0.1)
BASOPHILS NFR MAR MANUAL: 1 % (ref 0–1)
BILIRUB SERPL-MCNC: 0.33 MG/DL (ref 0.2–1)
BUN SERPL-MCNC: 21 MG/DL (ref 5–25)
CALCIUM SERPL-MCNC: 8.8 MG/DL (ref 8.4–10.2)
CHLORIDE SERPL-SCNC: 105 MMOL/L (ref 96–108)
CO2 SERPL-SCNC: 25 MMOL/L (ref 21–32)
CREAT SERPL-MCNC: 1.07 MG/DL (ref 0.6–1.3)
EOSINOPHIL # BLD MANUAL: 0 THOUSAND/UL (ref 0–0.4)
EOSINOPHIL NFR BLD MANUAL: 0 % (ref 0–6)
ERYTHROCYTE [DISTWIDTH] IN BLOOD BY AUTOMATED COUNT: 13.3 % (ref 11.6–15.1)
GFR SERPL CREATININE-BSD FRML MDRD: 78 ML/MIN/1.73SQ M
GLUCOSE SERPL-MCNC: 119 MG/DL (ref 65–140)
GLUCOSE SERPL-MCNC: 130 MG/DL (ref 65–140)
GLUCOSE SERPL-MCNC: 140 MG/DL (ref 65–140)
GLUCOSE SERPL-MCNC: 163 MG/DL (ref 65–140)
GLUCOSE SERPL-MCNC: 194 MG/DL (ref 65–140)
HCT VFR BLD AUTO: 44.3 % (ref 36.5–49.3)
HGB BLD-MCNC: 14.6 G/DL (ref 12–17)
LYMPHOCYTES # BLD AUTO: 31 % (ref 14–44)
LYMPHOCYTES # BLD AUTO: 5.75 THOUSAND/UL (ref 0.6–4.47)
MCH RBC QN AUTO: 31 PG (ref 26.8–34.3)
MCHC RBC AUTO-ENTMCNC: 33 G/DL (ref 31.4–37.4)
MCV RBC AUTO: 94 FL (ref 82–98)
MONOCYTES # BLD AUTO: 0.56 THOUSAND/UL (ref 0–1.22)
MONOCYTES NFR BLD: 4 % (ref 4–12)
NEUTROPHILS # BLD MANUAL: 7.57 THOUSAND/UL (ref 1.85–7.62)
NEUTS SEG NFR BLD AUTO: 54 % (ref 43–75)
PLATELET # BLD AUTO: 227 THOUSANDS/UL (ref 149–390)
PLATELET BLD QL SMEAR: ADEQUATE
PMV BLD AUTO: 11 FL (ref 8.9–12.7)
POLYCHROMASIA BLD QL SMEAR: PRESENT
POTASSIUM SERPL-SCNC: 4.2 MMOL/L (ref 3.5–5.3)
PROT SERPL-MCNC: 6.7 G/DL (ref 6.4–8.4)
RBC # BLD AUTO: 4.71 MILLION/UL (ref 3.88–5.62)
RBC MORPH BLD: PRESENT
SODIUM SERPL-SCNC: 140 MMOL/L (ref 135–147)
VARIANT LYMPHS # BLD AUTO: 10 %
WBC # BLD AUTO: 14.02 THOUSAND/UL (ref 4.31–10.16)

## 2024-06-29 PROCEDURE — 99223 1ST HOSP IP/OBS HIGH 75: CPT | Performed by: PSYCHIATRY & NEUROLOGY

## 2024-06-29 PROCEDURE — 80053 COMPREHEN METABOLIC PANEL: CPT

## 2024-06-29 PROCEDURE — 97162 PT EVAL MOD COMPLEX 30 MIN: CPT

## 2024-06-29 PROCEDURE — 70551 MRI BRAIN STEM W/O DYE: CPT

## 2024-06-29 PROCEDURE — 82948 REAGENT STRIP/BLOOD GLUCOSE: CPT

## 2024-06-29 PROCEDURE — 85007 BL SMEAR W/DIFF WBC COUNT: CPT

## 2024-06-29 PROCEDURE — 97166 OT EVAL MOD COMPLEX 45 MIN: CPT

## 2024-06-29 PROCEDURE — 85027 COMPLETE CBC AUTOMATED: CPT

## 2024-06-29 RX ORDER — INSULIN LISPRO 100 [IU]/ML
2-12 INJECTION, SOLUTION INTRAVENOUS; SUBCUTANEOUS
Status: DISCONTINUED | OUTPATIENT
Start: 2024-06-29 | End: 2024-06-30 | Stop reason: HOSPADM

## 2024-06-29 RX ORDER — LORAZEPAM 2 MG/ML
2 INJECTION INTRAMUSCULAR ONCE AS NEEDED
Status: DISCONTINUED | OUTPATIENT
Start: 2024-06-29 | End: 2024-06-30 | Stop reason: HOSPADM

## 2024-06-29 RX ORDER — LORAZEPAM 2 MG/ML
1 INJECTION INTRAMUSCULAR ONCE AS NEEDED
Status: DISCONTINUED | OUTPATIENT
Start: 2024-06-29 | End: 2024-06-30 | Stop reason: HOSPADM

## 2024-06-29 RX ORDER — LABETALOL HYDROCHLORIDE 5 MG/ML
10 INJECTION, SOLUTION INTRAVENOUS EVERY 4 HOURS PRN
Status: DISCONTINUED | OUTPATIENT
Start: 2024-06-29 | End: 2024-06-30 | Stop reason: HOSPADM

## 2024-06-29 RX ORDER — OLANZAPINE 5 MG/1
5 TABLET, ORALLY DISINTEGRATING ORAL ONCE AS NEEDED
Status: COMPLETED | OUTPATIENT
Start: 2024-06-29 | End: 2024-06-30

## 2024-06-29 RX ORDER — AMLODIPINE BESYLATE 5 MG/1
5 TABLET ORAL DAILY
Status: DISCONTINUED | OUTPATIENT
Start: 2024-06-29 | End: 2024-06-29

## 2024-06-29 RX ORDER — ASPIRIN 81 MG/1
81 TABLET, CHEWABLE ORAL DAILY
Status: DISCONTINUED | OUTPATIENT
Start: 2024-06-30 | End: 2024-06-30 | Stop reason: HOSPADM

## 2024-06-29 RX ORDER — HYDRALAZINE HYDROCHLORIDE 20 MG/ML
5 INJECTION INTRAMUSCULAR; INTRAVENOUS EVERY 6 HOURS PRN
Status: DISCONTINUED | OUTPATIENT
Start: 2024-06-29 | End: 2024-06-30 | Stop reason: HOSPADM

## 2024-06-29 RX ORDER — AMLODIPINE BESYLATE 5 MG/1
5 TABLET ORAL ONCE
Status: COMPLETED | OUTPATIENT
Start: 2024-06-29 | End: 2024-06-29

## 2024-06-29 RX ORDER — LABETALOL HYDROCHLORIDE 5 MG/ML
10 INJECTION, SOLUTION INTRAVENOUS ONCE
Status: COMPLETED | OUTPATIENT
Start: 2024-06-29 | End: 2024-06-29

## 2024-06-29 RX ORDER — AMLODIPINE BESYLATE 10 MG/1
10 TABLET ORAL DAILY
Status: DISCONTINUED | OUTPATIENT
Start: 2024-06-30 | End: 2024-06-30 | Stop reason: HOSPADM

## 2024-06-29 RX ORDER — DIPHENHYDRAMINE HCL 25 MG
50 TABLET ORAL ONCE AS NEEDED
Status: COMPLETED | OUTPATIENT
Start: 2024-06-29 | End: 2024-06-29

## 2024-06-29 RX ORDER — LORAZEPAM 2 MG/ML
2 INJECTION INTRAMUSCULAR ONCE AS NEEDED
Status: COMPLETED | OUTPATIENT
Start: 2024-06-29 | End: 2024-06-29

## 2024-06-29 RX ORDER — LISINOPRIL 10 MG/1
10 TABLET ORAL DAILY
Status: DISCONTINUED | OUTPATIENT
Start: 2024-06-29 | End: 2024-06-30 | Stop reason: HOSPADM

## 2024-06-29 RX ADMIN — ENOXAPARIN SODIUM 40 MG: 40 INJECTION SUBCUTANEOUS at 08:46

## 2024-06-29 RX ADMIN — ATORVASTATIN CALCIUM 40 MG: 40 TABLET, FILM COATED ORAL at 16:20

## 2024-06-29 RX ADMIN — AMLODIPINE BESYLATE 5 MG: 5 TABLET ORAL at 08:47

## 2024-06-29 RX ADMIN — DIPHENHYDRAMINE HCL 50 MG: 25 TABLET ORAL at 13:30

## 2024-06-29 RX ADMIN — INSULIN LISPRO 2 UNITS: 100 INJECTION, SOLUTION INTRAVENOUS; SUBCUTANEOUS at 08:46

## 2024-06-29 RX ADMIN — LORAZEPAM 2 MG: 2 INJECTION INTRAMUSCULAR; INTRAVENOUS at 13:57

## 2024-06-29 RX ADMIN — ASPIRIN 81 MG CHEWABLE TABLET 81 MG: 81 TABLET CHEWABLE at 08:47

## 2024-06-29 RX ADMIN — DOCUSATE SODIUM 100 MG: 100 CAPSULE, LIQUID FILLED ORAL at 08:47

## 2024-06-29 RX ADMIN — CLOPIDOGREL BISULFATE 75 MG: 75 TABLET ORAL at 08:47

## 2024-06-29 RX ADMIN — LABETALOL HYDROCHLORIDE 10 MG: 5 INJECTION, SOLUTION INTRAVENOUS at 10:42

## 2024-06-29 RX ADMIN — AMLODIPINE BESYLATE 5 MG: 5 TABLET ORAL at 10:41

## 2024-06-29 RX ADMIN — HYDRALAZINE HYDROCHLORIDE 5 MG: 20 INJECTION INTRAMUSCULAR; INTRAVENOUS at 11:42

## 2024-06-29 RX ADMIN — LISINOPRIL 10 MG: 10 TABLET ORAL at 12:50

## 2024-06-29 RX ADMIN — LABETALOL HYDROCHLORIDE 10 MG: 5 INJECTION, SOLUTION INTRAVENOUS at 12:50

## 2024-06-29 NOTE — ASSESSMENT & PLAN NOTE
Lab Results   Component Value Date    HGBA1C 7.1 (H) 06/28/2024       Recent Labs     06/28/24  1415 06/29/24  0841 06/29/24  1040 06/29/24  1609   POCGLU 194* 194* 119 140       Blood Sugar Average: Last 72 hrs:  (P) 161.75    Upon discharge, will likely start Metformin IR 500mg BID and order glucose check supplies  While inpatient, Accuchecks and SSI TID AC.

## 2024-06-29 NOTE — SPEECH THERAPY NOTE
Speech Language/Pathology    Consult received in accordance with a stroke alert.  Pt reports his symptoms of slurred speech resolved. His brother was present during speech and swallow screen and he expresses that the pts speech is baseline.  The pt took sips from a alfonso coffee cup at 45* positioning in bed without overt s/s aspiration or penetration.  The pt and Nurse deny any difficulty with swallowing.  No speech/swallow therapy warranted at this time as pts symptoms have resolved. Will sign off. Reconsult as needed.

## 2024-06-29 NOTE — PLAN OF CARE
Problem: Neurological Deficit  Goal: Neurological status is stable or improving  Description: Interventions:  - Monitor and assess patient's level of consciousness, motor function, sensory function, and level of assistance needed for ADLs.   - Monitor and report changes from baseline. Collaborate with interdisciplinary team to initiate plan and implement interventions as ordered.   - Provide and maintain a safe environment.  - Consider seizure precautions.  - Consider fall precautions.  - Consider aspiration precautions.  - Consider bleeding precautions.  Outcome: Progressing     Problem: Activity Intolerance/Impaired Mobility  Goal: Mobility/activity is maintained at optimum level for patient  Description: Interventions:  - Assess and monitor patient  barriers to mobility and need for assistive/adaptive devices.  - Assess patient's emotional response to limitations.  - Collaborate with interdisciplinary team and initiate plans and interventions as ordered.  - Encourage independent activity per ability.  - Maintain proper body alignment.  - Perform active/passive rom as tolerated/ordered.  - Plan activities to conserve energy.  - Turn patient as appropriate  Outcome: Progressing     Problem: Communication Impairment  Goal: Ability to express needs and understand communication  Description: Assess patient's communication skills and ability to understand information.  Patient will demonstrate use of effective communication techniques, alternative methods of communication and understanding even if not able to speak.     - Encourage communication and provide alternate methods of communication as needed.  - Collaborate with case management/ for discharge needs.  - Include patient/family/caregiver in decisions related to communication.  Outcome: Progressing     Problem: Potential for Aspiration  Goal: Non-ventilated patient's risk of aspiration is minimized  Description: Assess and monitor vital signs,  respiratory status, and labs (WBC).  Monitor for signs of aspiration (tachypnea, cough, rales, wheezing, cyanosis, fever).    - Assess and monitor patient's ability to swallow.  - Place patient up in chair to eat if possible.  - HOB up at 90 degrees to eat if unable to get patient up into chair.  - Supervise patient during oral intake.   - Instruct patient/ family to take small bites.  - Instruct patient/ family to take small single sips when taking liquids.  - Follow patient-specific strategies generated by speech pathologist.  Outcome: Progressing  Goal: Ventilated patient's risk of aspiration is minimized  Description: Assess and monitor vital signs, respiratory status, airway cuff pressure, and labs (WBC).  Monitor for signs of aspiration (tachypnea, cough, rales, wheezing, cyanosis, fever).    - Elevate head of bed 30 degrees if patient has tube feeding.  - Monitor tube feeding.  Outcome: Progressing     Problem: Nutrition  Goal: Nutrition/Hydration status is improving  Description: Monitor and assess patient's nutrition/hydration status for malnutrition (ex- brittle hair, bruises, dry skin, pale skin and conjunctiva, muscle wasting, smooth red tongue, and disorientation). Collaborate with interdisciplinary team and initiate plan and interventions as ordered.  Monitor patient's weight and dietary intake as ordered or per policy. Utilize nutrition screening tool and intervene per policy. Determine patient's food preferences and provide high-protein, high-caloric foods as appropriate.     - Assist patient with eating.  - Allow adequate time for meals.  - Encourage patient to take dietary supplement as ordered.  - Collaborate with clinical nutritionist.  - Include patient/family/caregiver in decisions related to nutrition.  Outcome: Progressing     Problem: PAIN - ADULT  Goal: Verbalizes/displays adequate comfort level or baseline comfort level  Description: Interventions:  - Encourage patient to monitor pain and  request assistance  - Assess pain using appropriate pain scale  - Administer analgesics based on type and severity of pain and evaluate response  - Implement non-pharmacological measures as appropriate and evaluate response  - Consider cultural and social influences on pain and pain management  - Notify physician/advanced practitioner if interventions unsuccessful or patient reports new pain  Outcome: Progressing     Problem: INFECTION - ADULT  Goal: Absence or prevention of progression during hospitalization  Description: INTERVENTIONS:  - Assess and monitor for signs and symptoms of infection  - Monitor lab/diagnostic results  - Monitor all insertion sites, i.e. indwelling lines, tubes, and drains  - Monitor endotracheal if appropriate and nasal secretions for changes in amount and color  - [place] appropriate cooling/warming therapies per order  - Administer medications as ordered  - Instruct and encourage patient and family to use good hand hygiene technique  - Identify and instruct in appropriate isolation precautions for identified infection/condition  Outcome: Progressing  Goal: Absence of fever/infection during neutropenic period  Description: INTERVENTIONS:  - Monitor WBC    Outcome: Progressing     Problem: SAFETY ADULT  Goal: Patient will remain free of falls  Description: INTERVENTIONS:  - Educate patient/family on patient safety including physical limitations  - Instruct patient to call for assistance with activity   - Consult OT/PT to assist with strengthening/mobility   - Keep Call bell within reach  - Keep bed low and locked with side rails adjusted as appropriate  - Keep care items and personal belongings within reach  - Initiate and maintain comfort rounds  - Make Fall Risk Sign visible to staff  - Offer Toileting every 2 Hours, in advance of need  - Initiate/Maintain bed alarm  - Obtain necessary fall risk management equipment:   - Apply yellow socks and bracelet for high fall risk patients  -  Consider moving patient to room near nurses station  Outcome: Progressing  Goal: Maintain or return to baseline ADL function  Description: INTERVENTIONS:  -  Assess patient's ability to carry out ADLs; assess patient's baseline for ADL function and identify physical deficits which impact ability to perform ADLs (bathing, care of mouth/teeth, toileting, grooming, dressing, etc.)  - Assess/evaluate cause of self-care deficits   - Assess range of motion  - Assess patient's mobility; develop plan if impaired  - Assess patient's need for assistive devices and provide as appropriate  - Encourage maximum independence but intervene and supervise when necessary  - Involve family in performance of ADLs  - Assess for home care needs following discharge   - Consider OT consult to assist with ADL evaluation and planning for discharge  - Provide patient education as appropriate  Outcome: Progressing    Problem: DISCHARGE PLANNING  Goal: Discharge to home or other facility with appropriate resources  Description: INTERVENTIONS:  - Identify barriers to discharge w/patient and caregiver  - Arrange for needed discharge resources and transportation as appropriate  - Identify discharge learning needs (meds, wound care, etc.)  - Arrange for interpretive services to assist at discharge as needed  - Refer to Case Management Department for coordinating discharge planning if the patient needs post-hospital services based on physician/advanced practitioner order or complex needs related to functional status, cognitive ability, or social support system  Outcome: Progressing     Problem: Knowledge Deficit  Goal: Patient/family/caregiver demonstrates understanding of disease process, treatment plan, medications, and discharge instructions  Description: Complete learning assessment and assess knowledge base.  Interventions:  - Provide teaching at level of understanding  - Provide teaching via preferred learning methods  Outcome: Progressing      Problem: Nutrition/Hydration-ADULT  Goal: Nutrient/Hydration intake appropriate for improving, restoring or maintaining nutritional needs  Description: Monitor and assess patient's nutrition/hydration status for malnutrition. Collaborate with interdisciplinary team and initiate plan and interventions as ordered.  Monitor patient's weight and dietary intake as ordered or per policy. Utilize nutrition screening tool and intervene as necessary. Determine patient's food preferences and provide high-protein, high-caloric foods as appropriate.     INTERVENTIONS:  - Monitor oral intake, urinary output, labs, and treatment plans  - Assess nutrition and hydration status and recommend course of action  - Evaluate amount of meals eaten  - Assist patient with eating if necessary   - Allow adequate time for meals  - Recommend/ encourage appropriate diets, oral nutritional supplements, and vitamin/mineral supplements  - Order, calculate, and assess calorie counts as needed  - Recommend, monitor, and adjust tube feedings and TPN/PPN based on assessed needs  - Assess need for intravenous fluids  - Provide specific nutrition/hydration education as appropriate  - Include patient/family/caregiver in decisions related to nutrition  Outcome: Progressing     Problem: NEUROSENSORY - ADULT  Goal: Achieves stable or improved neurological status  Description: INTERVENTIONS  - Monitor and report changes in neurological status  - Monitor vital signs such as temperature, blood pressure, glucose, and any other labs ordered   - Initiate measures to prevent increased intracranial pressure  - Monitor for seizure activity and implement precautions if appropriate      Outcome: Progressing  Goal: Remains free of injury related to seizures activity  Description: INTERVENTIONS  - Maintain airway, patient safety  and administer oxygen as ordered  - Monitor patient for seizure activity, document and report duration and description of seizure to  physician/advanced practitioner  - If seizure occurs,  ensure patient safety during seizure  - Reorient patient post seizure  - Seizure pads on all 4 side rails  - Instruct patient/family to notify RN of any seizure activity including if an aura is experienced  - Instruct patient/family to call for assistance with activity based on nursing assessment  - Administer anti-seizure medications if ordered    Outcome: Progressing  Goal: Achieves maximal functionality and self care  Description: INTERVENTIONS  - Monitor swallowing and airway patency with patient fatigue and changes in neurological status  - Encourage and assist patient to increase activity and self care.   - Encourage visually impaired, hearing impaired and aphasic patients to use assistive/communication devices  Outcome: Progressing     Problem: MUSCULOSKELETAL - ADULT  Goal: Maintain or return mobility to safest level of function  Description: INTERVENTIONS:  - Assess patient's ability to carry out ADLs; assess patient's baseline for ADL function and identify physical deficits which impact ability to perform ADLs (bathing, care of mouth/teeth, toileting, grooming, dressing, etc.)  - Assess/evaluate cause of self-care deficits   - Assess range of motion  - Assess patient's mobility  - Assess patient's need for assistive devices and provide as appropriate  - Encourage maximum independence but intervene and supervise when necessary  - Involve family in performance of ADLs  - Assess for home care needs following discharge   - Consider OT consult to assist with ADL evaluation and planning for discharge  - Provide patient education as appropriate  Outcome: Progressing  Goal: Maintain proper alignment of affected body part  Description: INTERVENTIONS:  - Support, maintain and protect limb and body alignment  - Provide patient/ family with appropriate education  Outcome: Progressing

## 2024-06-29 NOTE — CASE MANAGEMENT
Case Management Discharge Planning Note    Patient name Son Lafleur  Location [facility] 710-01 MRN [id number]  : 1970 Date 2024       Current Admission Date: 2024  Current Admission Diagnosis:TIA (transient ischemic attack), Weakness, Carotid stenosis, Lacunar infarct, acute (HCC), Weakness of extremity   There are no problems to display for this patient.     LOS (days): 1  Geometric Mean LOS (GMLOS) (days):   Days to GMLOS:     OBJECTIVE:  Risk of Unplanned Readmission Score: 8.47         Current admission status: Inpatient   Preferred Pharmacy:   [id number], [address] [address]  Phone: [phone] Fax: [phone]    Primary Care Provider: No primary care provider on file.    Primary Insurance: [facility]  Secondary Insurance:     DISCHARGE DETAILS:  Additional Comments: CM attempted to open, pt was getting an MRI. Symptoms have reprotedly resolved. NEEDS TO BE OPENED

## 2024-06-29 NOTE — PLAN OF CARE
Problem: Neurological Deficit  Goal: Neurological status is stable or improving  Description: Interventions:  - Monitor and assess patient's level of consciousness, motor function, sensory function, and level of assistance needed for ADLs.   - Monitor and report changes from baseline. Collaborate with interdisciplinary team to initiate plan and implement interventions as ordered.   - Provide and maintain a safe environment.  - Consider seizure precautions.  - Consider fall precautions.  - Consider aspiration precautions.  - Consider bleeding precautions.  Outcome: Progressing     Problem: Communication Impairment  Goal: Ability to express needs and understand communication  Description: Assess patient's communication skills and ability to understand information.  Patient will demonstrate use of effective communication techniques, alternative methods of communication and understanding even if not able to speak.     - Encourage communication and provide alternate methods of communication as needed.  - Collaborate with case management/ for discharge needs.  - Include patient/family/caregiver in decisions related to communication.  Outcome: Progressing     Problem: Potential for Aspiration  Goal: Non-ventilated patient's risk of aspiration is minimized  Description: Assess and monitor vital signs, respiratory status, and labs (WBC).  Monitor for signs of aspiration (tachypnea, cough, rales, wheezing, cyanosis, fever).    - Assess and monitor patient's ability to swallow.  - Place patient up in chair to eat if possible.  - HOB up at 90 degrees to eat if unable to get patient up into chair.  - Supervise patient during oral intake.   - Instruct patient/ family to take small bites.  - Instruct patient/ family to take small single sips when taking liquids.  - Follow patient-specific strategies generated by speech pathologist.  Outcome: Progressing     Problem: Nutrition  Goal: Nutrition/Hydration status is  improving  Description: Monitor and assess patient's nutrition/hydration status for malnutrition (ex- brittle hair, bruises, dry skin, pale skin and conjunctiva, muscle wasting, smooth red tongue, and disorientation). Collaborate with interdisciplinary team and initiate plan and interventions as ordered.  Monitor patient's weight and dietary intake as ordered or per policy. Utilize nutrition screening tool and intervene per policy. Determine patient's food preferences and provide high-protein, high-caloric foods as appropriate.     - Assist patient with eating.  - Allow adequate time for meals.  - Encourage patient to take dietary supplement as ordered.  - Collaborate with clinical nutritionist.  - Include patient/family/caregiver in decisions related to nutrition.  Outcome: Progressing     Problem: PAIN - ADULT  Goal: Verbalizes/displays adequate comfort level or baseline comfort level  Description: Interventions:  - Encourage patient to monitor pain and request assistance  - Assess pain using appropriate pain scale  - Administer analgesics based on type and severity of pain and evaluate response  - Implement non-pharmacological measures as appropriate and evaluate response  - Consider cultural and social influences on pain and pain management  - Notify physician/advanced practitioner if interventions unsuccessful or patient reports new pain  Outcome: Progressing

## 2024-06-29 NOTE — PHYSICAL THERAPY NOTE
"     Physical Therapy Evaluation    Patient's Name: Son Lafleur    Admitting Diagnosis  TIA (transient ischemic attack) [G45.9]  Weakness [R53.1]  Carotid stenosis [I65.29]  Lacunar infarct, acute (HCC) [I63.81]  Weakness of extremity [R29.898]    Problem List  There is no problem list on file for this patient.      Past Medical History  History reviewed. No pertinent past medical history.    Past Surgical History  Past Surgical History:   Procedure Laterality Date    SHOULDER SURGERY      SKIN GRAFT          06/29/24 0849   PT Last Visit   PT Visit Date 06/29/24   Note Type   Note type Evaluation   Pain Assessment   Pain Assessment Tool 0-10   Pain Score 5   Pain Location/Orientation Orientation: Right;Location: Knee   Pain Onset/Description   (\"That's the knee I have arthritis in.\" Pt reports getting injections)   Hospital Pain Intervention(s) Ambulation/increased activity  (instructed pt in nonreciprocal pattern on stairs)   Restrictions/Precautions   Weight Bearing Precautions Per Order No   Other Precautions Telemetry;Fall Risk;Pain   Home Living   Type of Home House   Home Layout   (+stairs)   Bathroom Shower/Tub Walk-in shower   Bathroom Toilet Standard   Prior Function   Level of [place] Independent with functional mobility;Independent with ADLs;Independent with IADLS  (I community ambulator w/o AD)   Lives With   (3 roommates)   IADLs Independent with driving;Independent with meal prep;Independent with medication management   Falls in the last 6 months 0   Vocational Full time employment  ()   General   Family/Caregiver Present No   Cognition   Arousal/Participation Alert   Orientation Level Oriented X4   Comments pleasant + cooperative   Subjective   Subjective Agreeable to mobilize.   RLE Assessment   RLE Assessment WFL   LLE Assessment   LLE Assessment WFL   Coordination   Movements are Fluid and Coordinated 1   Bed Mobility   Supine to Sit 6  Modified independent   Additional items " HOB elevated   Additional Comments Pt greeted in supine.   Transfers   Sit to Stand 7  Independent   Stand to Sit 7  Independent   Additional Comments no AD   Ambulation/Elevation   Gait pattern   (lateral trunk lean bilaterally)   Gait Assistance 6  Modified independent   Assistive Device None   Distance 50'x2   Stair Management Assistance 5  Supervision   Additional items Verbal cues  (cues to use nonrecipocal pattern; 1 instance of R knee buckling)   Stair Management Technique One rail R;Reciprocal;Nonreciprocal   Number of Stairs 7   Balance   Static Sitting Normal   Dynamic Sitting Good   Static Standing Fair +   Dynamic Standing Fair   Ambulatory Fair   Endurance Deficit   Endurance Deficit No   Activity Tolerance   Activity Tolerance Patient tolerated treatment well   Medical Staff Made Aware TATO Yoon   Nurse Made Aware yes - cleared + observed mobility   Assessment   Assessment Pt is 54 y.o. male seen for a PT evaluation s/p admit to [facility] on 6/28/2024. Pt presenting w/ transient RUE weakness + clumsiness; dx TIA. Please see above for other active problem list / PMH.     PT now consulted to assess functional mobility and needs for safe d/c planning. Prior to admission, pt was I w/ ambulation w/ AD community distances, lives w/ 3 roommates in a house w/ stairs.     Currently pt is Alberto for bed skills; I for functional transfers; Alberto for ambulation w/o AD; S for stair negotiation. Pt presents near functional baseline. No further skilled acute PT needs. Will d/c from caseload.   Goals   Patient Goals none expressed   Plan   PT Frequency   (d/c PT)   Discharge Recommendation   Rehab Resource Intensity Level, PT No post-acute rehabilitation needs   AM-PAC Basic Mobility Inpatient   Turning in Flat Bed Without Bedrails 4   Lying on Back to Sitting on Edge of Flat Bed Without Bedrails 4   Moving Bed to Chair 4   Standing Up From Chair Using Arms 4   Walk in Room 4   Climb 3-5 Stairs  With Railing 3   Basic Mobility Inpatient Raw Score 23   Basic Mobility Standardized Score 50.88   Alfredo Wagoner Highest Level Of Mobility   -HLM Goal 7: Walk 25 feet or more   -HLM Achieved 7: Walk 25 feet or more   End of Consult   Patient Position at End of Consult Seated edge of bed;All needs within reach  (nurse at bedside)     Melany Cabrales, PT, DPT

## 2024-06-29 NOTE — ASSESSMENT & PLAN NOTE
Started Amlodipine 10mg daily and Lisinopril 10mg daily.  Monitor kidney functions closely due to URSULA on initial presentation.  PRN Hydralazine and Labetalol.

## 2024-06-29 NOTE — ED ATTENDING ATTESTATION
6/28/2024  I, Robe Domingo DO, saw and evaluated the patient. I have discussed the patient with the resident/non-physician practitioner and agree with the resident's/non-physician practitioner's findings, Plan of Care, and MDM as documented in the resident's/non-physician practitioner's note, except where noted. All available labs and Radiology studies were reviewed.  I was present for key portions of any procedure(s) performed by the resident/non-physician practitioner and I was immediately available to provide assistance.       At this point I agree with the current assessment done in the Emergency Department.  I have conducted an independent evaluation of this patient a history and physical is as follows: This is a 54-year-old male presents emergency department with noted right hand weakness that lasted for approximately 10 minutes he dropped his food, he had no other signs or symptoms, he felt off and altered at the time and could not get words out, the symptoms lasted for approximately 10 minutes and currently is back to baseline, he has no previous history of stroke he, he noted has a history of hypertension has not been taking his medication for several months duration, initial vital signs he is noted to be hypertensive, the rest of his vital signs are stable at this point in time a normal neurological exam was performed in the emergency department with no evidence of focal neurological deficits, CTA of head and neck was performed as well as labs, differential diagnosis includes TIA, stroke less likely given his symptoms have since resolved, complex migraine,    Will admit for stroke workup    ED Course         Critical Care Time  Procedures

## 2024-06-29 NOTE — H&P
NEUROLOGY RESIDENCY - ADMISSION H&P NOTE     Name: Son Lafleur   Age & Sex: 54 y.o. male   MRN: [id number]  Unit/Bed#: [facility] 710-01   Encounter: [id number]      Anticipated Length of Stay:  Patient will be admitted on an Inpatient basis with an anticipated length of stay of  2 midnights.     Justification for Hospital Stay: stroke workup, BP management    Will need follow up with neurovascular attending/AP/resident in 6-8 weeks (60 min).    Pending for discharge: TTE, BP management    ASSESSMENT & PLAN     * Stroke (cerebrum) (HCC)  Assessment & Plan  54-year-old male presenting after a transient episode of right-sided weakness and speech disturbance with total duration of 10 minutes. Upon arrival patient was asymptomatic with initial NIHSS of 0. Found to have an elevated initial BP of 169/141. Imaging consisting of CTA H/N revealed subacute to chronic right caudate and Td caudate white matter lacunar infarcts, mild cervical carotid atherosclerotic disease, severe tapering of the left supraclinoid ICA and markedly small carotid terminus as well as proximal CLAUDIA and MCA branches, severe focal stenosis at origin of the dominant right A1 segment, mild stenosis in the proximal right M1 segment, R >L PCA stenosis, and congenital and acquired cervical spinal stenosis with severe stenosis in the lower cervical spine.   Home Antiplatelet or Anticoagulants PTA:  None  Stroke risk factors: HTN, HLD, T2DM, elevated BMI  Prior stroke history: none. Past Neurological History: no neurological problems     Exam on 06/28/24: No focal deficits    MRI brain wo contrast, 6/29: Focal acute infarction in L anterior caudate body. Chronic hemorrhagic infarction and regional gliosis in R anterior caudate body.  TTE: PENDING  Telemetry: No evidence of cardiac arrhythmia    Lab Results   Component Value Date    CHOLESTEROL 202 (H) 06/28/2024    TRIG 124 06/28/2024    HDL 43 06/28/2024    LDLCALC 134 (H) 06/28/2024     Lab Results  "  Component Value Date    HGBA1C 7.1 (H) 06/28/2024     No results found for: \"AQF4OOIOHVSN\", \"FREET4\"       Pertinent scores:  Initial NIHSS: 0  Modified [place] Score: 0 (No baseline symptoms/disability)     Impression: Evidence of acute infarction in L anterior caudate body, along with chronic hemorrhage infarction and regional gliosis in R anterior caudate body.      Plan: Discussed plan with neurology attending, Dr. Jeff  S/p  x1 and Plavix 300 x1, followed by ASA 81mg daily and Plavix 75mg daily. DAPT x 21 days, and then ASA monotherapy.  Lipitor 40mg daily. LDL goal <7%.  Gradual decline to normotension.  Normoglycemia (<180), normothermia  A1c goal <7%.  PT/OT/ST - no rehab needs  Neurochecks  Monitor on telemetry  Pending TTE  CM/nutrition consulted, appreciate assistance  Will need follow up with neurovascular attending/AP/resident in 6-8 weeks (60 min).    Hyperlipidemia  Assessment & Plan  Atorvastatin 40mg daily    T2DM (type 2 diabetes mellitus) ([facility])  Assessment & Plan  Lab Results   Component Value Date    HGBA1C 7.1 (H) 06/28/2024       Recent Labs     06/28/24  1415 06/29/24  0841 06/29/24  1040 06/29/24  1609   POCGLU 194* 194* 119 140       Blood Sugar Average: Last 72 hrs:  (P) 161.75    Upon discharge, will likely start Metformin IR 500mg BID and order glucose check supplies  While inpatient, Accuchecks and SSI TID AC.    Hypertension  Assessment & Plan  Started Amlodipine 10mg daily and Lisinopril 10mg daily.  Monitor kidney functions closely due to URSULA on initial presentation.  PRN Hydralazine and Labetalol.        VTE Prophylaxis: Enoxaparin (Lovenox)  / sequential compression device   Code Status: Level 1 full code  POLST: POLST form is not discussed and not completed at this time.    CHIEF COMPLAINT     Chief Complaint   Patient presents with    Extremity Weakness     Patient reports eating lunch today and mid bite could not use right side of his body. Patient reports being unable to " verbalize to coworkers what was going on. Jay stated patient had slurred speech. Patient symptoms resolved at this time.         HISTORY OF PRESENT ILLNESS     Son Lafleur is a 54-year-old male presenting after an episode of transient right-sided weakness. Patient states while at work he sat down to have lunch where he began experiencing weakness of the right arm and right leg (arm >leg). Also noted associated speech disturbance described as slurred speech and difficulty getting his words out. States that symptoms began abruptly. Total duration of 10 minutes. States that after episode completely returned back to his baseline. This is never happened in the past before. Denies any other associated symptoms.     REVIEW OF SYSTEMS     Review of Systems    PAST MEDICAL HISTORY   History reviewed. No pertinent past medical history.    Allergies:   No Known Allergies    PAST SURGICAL HISTORY     Past Surgical History:   Procedure Laterality Date    SHOULDER SURGERY      SKIN GRAFT         SOCIAL & FAMILY HISTORY     Social History     Substance and Sexual Activity   Alcohol Use Yes    Comment: rarely       Social History     Substance and Sexual Activity   Drug Use No     Social History     Tobacco Use   Smoking Status Never   Smokeless Tobacco Never       Marital Status: Single   Occupation:   Patient Pre-hospital Living Situation: Home  Patient Pre-hospital Level of Mobility: Independent  Patient Pre-hospital Diet Restrictions: None    Family History:  History reviewed. No pertinent family history.        OBJECTIVE     Vitals:    24 1239 24 1335 24 1401 24 1540   BP: (!) 182/93 161/96  160/92   Pulse: 78 76  77   Resp:   18    Temp:    97.8 °F (36.6 °C)   TempSrc:       SpO2: 97% 98%  97%   Weight:       Height:            Temperature:   Temp (24hrs), Av.7 °F (36.5 °C), Min:97.4 °F (36.3 °C), Max:97.9 °F (36.6 °C)    Temperature: 97.8 °F (36.6 °C)    Intake & Output:  I/O           0701  06/28 0700 06/28 0701  06/29 0700 06/29 0701  06/30 0700    P.O.   480    Total Intake(mL/kg)   480 (3.2)    Net   +480                   Weights:   IBW (Ideal Body Weight): 73 kg    Body mass index is 48.07 kg/m².  Weight (last 2 days)       Date/Time Weight    06/28/24 1811 152 (335)          GENERAL EXAM:  Constitutional: Not in acute distress. Not ill-appearing, toxic-appearing or diaphoretic.   HENT: Normocephalic and atraumatic. Nose and Ears normal.   Eyes: No scleral icterus. No discharge.   Cardiovascular:  Distal extremities warm without palpable edema or tenderness, no observed significant swelling.   Pulmonary: Pulmonary effort is normal. Not in respiratory distress  Musculoskeletal: No swelling or deformity.  Psychiatric: Normal behavior and appropriate affect      NEUROLOGIC  EXAM:  Mental Status: Alertness: alert, Orientation: time, date, person, place, Speech/Language fluent, clear, coherent, Commands: Can follow multistep commands  Cranial Nerves:  I Not tested   II Visual Fields normal   II, Ill,  IV, VI Pupils equal, round, reactive to light and accommodation  EOM full and intact   V facial sensation was normal and symmetrical   VII facial symmetry equal   VIII Normal hearing to speech   IX, X Normal Palatal Elevation, No Uvular Deviation   XI Shoulder shrug and head turn is normal   XII Midline tongue protrusion   Motor: No pronator drift, No atrophy or muscle wasting, Normal tone, No Involuntary Movements, and No tremors appreciated  Arm Right  Left Leg Right  Left   Deltoid 5/5 5/5 lliopsoas 5/5 5/5   Biceps 5/5 5/5 Quads 5/5 5/5   Triceps 5/5 5/5 Hamstrings 5/5 5/5   Wrist Extension 5/5 5/5 Ankle Dorsi Flexion 5/5 5/5   Wrist Flexion 5/5 5/5 Ankle Plantar Flexion 5/5 5/5   Sensory: Normal sensation to light touch  Coordination: Cerebellar arm drift absent. Finger To Nose: Right Intact, Left Intact.  Station/Gait: Deferred      LABORATORY DATA     Labs: I have personally reviewed  "pertinent reports.    Results from last 7 days   Lab Units 06/29/24 0449 06/28/24 2035 06/28/24  1456   WBC Thousand/uL 14.02*  --  22.61*   HEMOGLOBIN g/dL 14.6  --  14.2   HEMATOCRIT % 44.3  --  43.7   PLATELETS Thousands/uL 227 215 248   SEGS PCT %  --   --  73   MONO PCT % 4  --  7   EOS PCT % 0  --  0      Results from last 7 days   Lab Units 06/29/24  0449 06/28/24  1456   SODIUM mmol/L 140 137   POTASSIUM mmol/L 4.2 4.0   CHLORIDE mmol/L 105 103   CO2 mmol/L 25 25   BUN mg/dL 21 21   CREATININE mg/dL 1.07 1.31*   CALCIUM mg/dL 8.8 9.2   ALK PHOS U/L 35 45   ALT U/L 22 22   AST U/L 23 26                            Micro:  No results found for: \"BLOODCX\", \"URINECX\", \"WOUNDCULT\", \"SPUTUMCULTUR\"    IMAGING & DIAGNOSTIC TESTING     Radiology Results: I have personally reviewed pertinent reports.   and I have personally reviewed pertinent films in PACS    MRI brain wo contrast   Final Result by Pallav N Shah, MD (06/29 1628)      Focal acute infarction in the left anterior caudate body.      Chronic hemorrhagic infarction and regional gliosis in the right anterior caudate body.      The study was marked in EPIC for immediate notification.      Workstation performed: [id number]         CTA head and neck with and without contrast   Final Result by Pallav N Shah, MD (06/28 1632)      BRAIN: No acute intracranial hemorrhage or cortical infarction. Subacute to chronic right caudate and pericaudate white matter lacunar infarcts.      CTA NECK: Mild cervical carotid atherosclerotic disease. No hemodynamically significant stenosis at the carotid bifurcations.      CTA HEAD: Severe tapering of the left supraclinoid ICA and markedly small carotid terminus as well as proximal CLAUDIA and MCA branches. Severe focal stenosis at the origin of the dominant right A1 segment.. Mild stenosis in the proximal right M1 segment.    Right greater than left PCA stenosis.      MISCELLANEOUS: Congenital and acquired cervical spinal stenosis " with severe stenosis in the lower cervical spine. Nonemergent follow-up MRI imaging is recommended.                              Workstation performed: [id number]             Other Diagnostic Testing: I have personally reviewed pertinent reports.      ACTIVE MEDICATIONS     Current Facility-Administered Medications   Medication Dose Route Frequency    [START ON 6/30/2024] amLODIPine (NORVASC) tablet 10 mg  10 mg Oral Daily    [START ON 6/30/2024] aspirin chewable tablet 81 mg  81 mg Oral Daily    atorvastatin (LIPITOR) tablet 40 mg  40 mg Oral QPM    clopidogrel (PLAVIX) tablet 75 mg  75 mg Oral Daily    docusate sodium (COLACE) capsule 100 mg  100 mg Oral BID    enoxaparin (LOVENOX) subcutaneous injection 40 mg  40 mg Subcutaneous Daily    hydrALAZINE (APRESOLINE) injection 5 mg  5 mg Intravenous Q6H PRN    insulin lispro (HumALOG/ADMELOG) 100 units/mL subcutaneous injection 2-12 Units  2-12 Units Subcutaneous TID AC    labetalol (NORMODYNE) injection 10 mg  10 mg Intravenous Q4H PRN    lisinopril (ZESTRIL) tablet 10 mg  10 mg Oral Daily    LORazepam (ATIVAN) injection 1 mg  1 mg Intravenous Once PRN         HOME MEDICATIONS     Prior to Admission medications    Medication Sig Start Date End Date Taking? Authorizing Provider   albuterol (ProAir HFA) 90 mcg/act inhaler Inhale 2 puffs every 6 (six) hours as needed for shortness of breath  Patient not taking: Reported on 6/28/2024 11/21/20   PRESTON Forde   benzonatate (TESSALON) 200 MG capsule Take 1 capsule (200 mg total) by mouth 3 (three) times a day as needed for cough  Patient not taking: Reported on 6/28/2024 11/21/20   PRESTON Forde   dexamethasone (DECADRON) 4 mg tablet Take 1 tablet (4 mg total) by mouth 2 (two) times a day with meals  Patient not taking: Reported on 6/28/2024 11/21/20   PRESTON Forde   diphenhydrAMINE (BENADRYL) 25 mg capsule Take 1 capsule by mouth every 6 (six) hours as needed for itching, allergies or sleep for up to 30  days 3/11/17 4/10/17  Hernandez Shultz Jr., DO   fluticasone (FLONASE) 50 mcg/act nasal spray 1 spray into each nostril daily for 30 days 3/11/17 4/10/17  Hernandez Shultz Jr., DO     ACTIVE MEDICATIONS    ======    I have discussed the patient's history, physical exam findings, assessment, and plan in detail with attending, Dr. Jeff    Thank you for allowing me to participate in the care of your patient, Son Lafleur.    Salud Monteiro, DO  [facility] Neurology Res[facility], PGY-2

## 2024-06-29 NOTE — OCCUPATIONAL THERAPY NOTE
Occupational Therapy Evaluation     Patient Name: Son Lafleur  Today's Date: 6/29/2024  Problem List  Active Problems:  There are no active Hospital Problems.    Past Medical History  History reviewed. No pertinent past medical history.  Past Surgical History  Past Surgical History:   Procedure Laterality Date    SHOULDER SURGERY      SKIN GRAFT          06/29/24 0848   OT Last Visit   OT Visit Date 06/29/24   Note Type   Note type Evaluation   Pain Assessment   Pain Assessment Tool 0-10   Pain Score 5   Pain Location/Orientation Orientation: Right;Location: Knee   Effect of Pain on Daily Activities limits comfort   Patient's Stated Pain Goal No pain   Hospital Pain Intervention(s) Repositioned;Ambulation/increased activity;Emotional support   Restrictions/Precautions   Weight Bearing Precautions Per Order No   Other Precautions Telemetry;Fall Risk;Pain   Home Living   Type of Home House   Home Layout Two level;Performs ADLs on one level;Able to live on main level with bedroom/bathroom  (Pt lives on the second floor of a house w/ rommates; FFSTE.)   Bathroom Shower/Tub Walk-in shower   Bathroom Toilet Standard   Bathroom Equipment   (pt denies)   Home Equipment   (pt denies)   Prior Function   Level of [place] Independent with ADLs;Independent with functional mobility;Independent with IADLS   Lives With Other (Comment)  (Pt reports living w/ three roommate.)   IADLs Independent with driving;Independent with meal prep;Independent with medication management   Falls in the last 6 months 0   Vocational Full time employment   Lifestyle   Autonomy Pt reports I w/ ADLs, IADLs, and fxnl mobility w/o AD at baseline; + driving.   Reciprocal Relationships Pt lives w/ three roommates.   Service to Others Pt works full-time as a .   Intrinsic Gratification Pt enjoys watching TV.   General   Family/Caregiver Present No   ADL   Eating Assistance 7  Independent   Grooming Assistance 7  Independent    Grooming Deficit Wash/dry hands   UB Bathing Assistance 6  Modified Independent   LB Bathing Assistance 5  Supervision/Setup   UB Dressing Assistance 6  Modified independent   LB Dressing Assistance 5  Supervision/Setup   Toileting Assistance  6  Modified independent   Toileting Deficit Grab bar use;Clothing management up;Clothing management down   Bed Mobility   Supine to Sit 6  Modified independent   Additional items HOB elevated;Bedrails   Sit to Supine Unable to assess   Additional Comments Pt seated EOB at end of OT evaluation w/ all needs within reach.   Transfers   Sit to Stand 7  Independent   Stand to Sit 7  Independent   Additional Comments completed w/o AD   Functional Mobility   Functional Mobility 6  Modified independent   Additional Comments Pt ambulated household distance w/ mod I and w/o AD.   Additional items   (no AD)   Balance   Static Sitting Fair +   Dynamic Sitting Fair +   Static Standing Fair   Dynamic Standing Fair   Ambulatory Fair   Activity Tolerance   Activity Tolerance Patient tolerated treatment well   Medical Staff Made Aware PTMelany, due to pt's medical complexity and multiple comorbidities   Nurse Made Aware RN clearance prior to session   RUE Assessment   RUE Assessment WFL   LUE Assessment   LUE Assessment WFL   Hand Function   Gross Motor Coordination Functional   Fine Motor Coordination Functional   Vision - Complex Assessment   Additional Comments Pt reports no vision changes w/ episode. Pt able to read clock on wall.   Cognition   Overall Cognitive Status WFL   Arousal/Participation Alert;Responsive;Cooperative   Attention Within functional limits   Orientation Level Oriented X4   Memory Within functional limits   Following Commands Follows all commands and directions without difficulty   Comments Pt was pleasant, cooperative, and willing to participate in OT evaluation.   Assessment   Assessment Pt is a 54 y.o. male seen for OT evaluation s/p admission to [facility] on 6/28/2024 due to R UE weakness. Imaging negative for signs of acute stroke at this time. Pt has no significant PMH on file impacting occupational performance. Pt with active OT evaluation and treatment orders and activity orders. PTA, pt living with three roommates in a 2 SH w/ FFSTE; lives on second floor. Pt reports I w/ ADLs, IADLs, and fxnl mobility w/o AD at baseline; + driving. Pt agreeable and willing to participate in OT evaluation. During evaluation, pt was I for eating and grooming, mod I for UB ADLs and toileting, and S for LB ADLs. Pt was also mod I for bed mobility and fxnl mobility w/o AD and I for transfers. Pt performing ADLs at/near baseline level of independence. No further acute OT needs identified at this time. Recommend continued active ADL participation and mobilization with hospital staff while in the hospital to increase pt’s endurance and strength upon D/C. From OT standpoint, recommend D/C to home with family support when medically cleared. D/C pt from OT caseload at this time.   Goals   Patient Goals none expression   Plan   OT Frequency Eval only   Discharge Recommendation   Rehab Resource Intensity Level, OT No post-acute rehabilitation needs   AM-PAC Daily Activity Inpatient   Lower Body Dressing 3   Bathing 3   Toileting 4   Upper Body Dressing 4   Grooming 4   Eating 4   Daily Activity Raw Score 22   Daily Activity Standardized Score (Calc for Raw Score >=11) 47.1   -PAC Applied Cognition Inpatient   Following a Speech/Presentation 4   Understanding Ordinary Conversation 4   Taking Medications 4   Remembering Where Things Are Placed or Put Away 4   Remembering List of 4-5 Errands 4   Taking Care of Complicated Tasks 4   Applied Cognition Raw Score 24   Applied Cognition Standardized Score 62.21       The patient's raw score on the -PAC Daily Activity Inpatient Short Form is 22. A raw score of greater than or equal to 19 suggests the patient may benefit from discharge  to home. Please refer to the recommendation of the Occupational Therapist for safe discharge planning.    KAMRON Lutz, OTR/L

## 2024-06-29 NOTE — ASSESSMENT & PLAN NOTE
"54-year-old male presenting after a transient episode of right-sided weakness and speech disturbance with total duration of 10 minutes. Upon arrival patient was asymptomatic with initial NIHSS of 0. Found to have an elevated initial BP of 169/141. Imaging consisting of CTA H/N revealed subacute to chronic right caudate and Td caudate white matter lacunar infarcts, mild cervical carotid atherosclerotic disease, severe tapering of the left supraclinoid ICA and markedly small carotid terminus as well as proximal CLAUDIA and MCA branches, severe focal stenosis at origin of the dominant right A1 segment, mild stenosis in the proximal right M1 segment, R >L PCA stenosis, and congenital and acquired cervical spinal stenosis with severe stenosis in the lower cervical spine.   Home Antiplatelet or Anticoagulants PTA:  None  Stroke risk factors: HTN, HLD, T2DM, elevated BMI  Prior stroke history: none. Past Neurological History: no neurological problems     Exam on 06/28/24: No focal deficits    MRI brain wo contrast, 6/29: Focal acute infarction in L anterior caudate body. Chronic hemorrhagic infarction and regional gliosis in R anterior caudate body.  TTE, 6/30: EF 55%, normal systolic fxn, G1DD, LA normal size, IVC normal size, no major valvular dysfunction  Telemetry: No evidence of cardiac arrhythmia    Lab Results   Component Value Date    CHOLESTEROL 202 (H) 06/28/2024    TRIG 124 06/28/2024    HDL 43 06/28/2024    LDLCALC 134 (H) 06/28/2024     Lab Results   Component Value Date    HGBA1C 7.1 (H) 06/28/2024     No results found for: \"[id number]\", \"FREET4\"       Pertinent scores:  Initial NIHSS: 0  Modified [place] Score: 0 (No baseline symptoms/disability)     Impression: Evidence of acute infarction in L anterior caudate body, along with chronic hemorrhage infarction and regional gliosis in R anterior caudate body. Multiple vascular risk factors. Suspect small vessel disease.     Plan: Discussed plan with neurology " attending, Dr. Jeff  S/p  x1 and Plavix 300 x1, followed by ASA 81mg daily and Plavix 75mg daily. DAPT x 21 days, and then ASA monotherapy.  Lipitor 40mg daily. LDL goal <7%.  Gradual decline to normotension.  Normoglycemia (<180), normothermia  A1c goal <7%.  PT/OT/ST - no rehab needs  Neurochecks  Monitored on telemetry  CM/nutrition consulted, appreciated assistance  Will need follow up with neurovascular attending/AP/resident in 6-8 weeks (60 min).  Ambulatory referral to sleep medicine provided to evaluate for sleep apnea.  Referral provided to establish care with a PCP.

## 2024-06-30 ENCOUNTER — APPOINTMENT (INPATIENT)
Dept: NON INVASIVE DIAGNOSTICS | Facility: HOSPITAL | Age: 54
DRG: 065 | End: 2024-06-30
Payer: COMMERCIAL

## 2024-06-30 ENCOUNTER — APPOINTMENT (OUTPATIENT)
Dept: RADIOLOGY | Facility: HOSPITAL | Age: 54
DRG: 065 | End: 2024-06-30
Payer: COMMERCIAL

## 2024-06-30 VITALS
WEIGHT: 315 LBS | SYSTOLIC BLOOD PRESSURE: 149 MMHG | HEART RATE: 72 BPM | BODY MASS INDEX: 45.1 KG/M2 | DIASTOLIC BLOOD PRESSURE: 76 MMHG | RESPIRATION RATE: 16 BRPM | HEIGHT: 70 IN | OXYGEN SATURATION: 99 % | TEMPERATURE: 97.6 F

## 2024-06-30 LAB
ANION GAP SERPL CALCULATED.3IONS-SCNC: 6 MMOL/L (ref 4–13)
AORTIC ROOT: 2.9 CM
APICAL FOUR CHAMBER EJECTION FRACTION: 51 %
ASCENDING AORTA: 3.1 CM
ATRIAL RATE: 74 BPM
ATRIAL RATE: 81 BPM
BASOPHILS # BLD AUTO: 0.08 THOUSANDS/ÂΜL (ref 0–0.1)
BASOPHILS NFR BLD AUTO: 1 % (ref 0–1)
BSA FOR ECHO PROCEDURE: 2.6 M2
BUN SERPL-MCNC: 17 MG/DL (ref 5–25)
CALCIUM SERPL-MCNC: 8.6 MG/DL (ref 8.4–10.2)
CHLORIDE SERPL-SCNC: 102 MMOL/L (ref 96–108)
CO2 SERPL-SCNC: 30 MMOL/L (ref 21–32)
CREAT SERPL-MCNC: 1.13 MG/DL (ref 0.6–1.3)
E WAVE DECELERATION TIME: 219 MS
E/A RATIO: 0.96
EOSINOPHIL # BLD AUTO: 0.1 THOUSAND/ÂΜL (ref 0–0.61)
EOSINOPHIL NFR BLD AUTO: 1 % (ref 0–6)
ERYTHROCYTE [DISTWIDTH] IN BLOOD BY AUTOMATED COUNT: 13.6 % (ref 11.6–15.1)
FRACTIONAL SHORTENING: 26 (ref 28–44)
GFR SERPL CREATININE-BSD FRML MDRD: 73 ML/MIN/1.73SQ M
GLUCOSE SERPL-MCNC: 110 MG/DL (ref 65–140)
GLUCOSE SERPL-MCNC: 116 MG/DL (ref 65–140)
GLUCOSE SERPL-MCNC: 119 MG/DL (ref 65–140)
GLUCOSE SERPL-MCNC: 124 MG/DL (ref 65–140)
HCT VFR BLD AUTO: 44.5 % (ref 36.5–49.3)
HGB BLD-MCNC: 14.6 G/DL (ref 12–17)
IMM GRANULOCYTES # BLD AUTO: 0.06 THOUSAND/UL (ref 0–0.2)
IMM GRANULOCYTES NFR BLD AUTO: 1 % (ref 0–2)
INTERVENTRICULAR SEPTUM IN DIASTOLE (PARASTERNAL SHORT AXIS VIEW): 1.2 CM
INTERVENTRICULAR SEPTUM: 1.2 CM (ref 0.6–1.1)
LAAS-AP2: 16.8 CM2
LAAS-AP4: 18.9 CM2
LEFT ATRIUM SIZE: 4.2 CM
LEFT ATRIUM VOLUME (MOD BIPLANE): 50 ML
LEFT ATRIUM VOLUME INDEX (MOD BIPLANE): 19.2 ML/M2
LEFT INTERNAL DIMENSION IN SYSTOLE: 4 CM (ref 2.1–4)
LEFT VENTRICULAR INTERNAL DIMENSION IN DIASTOLE: 5.4 CM (ref 3.5–6)
LEFT VENTRICULAR POSTERIOR WALL IN END DIASTOLE: 1.2 CM
LEFT VENTRICULAR STROKE VOLUME: 71 ML
LVSV (TEICH): 71 ML
LYMPHOCYTES # BLD AUTO: 4.51 THOUSANDS/ÂΜL (ref 0.6–4.47)
LYMPHOCYTES NFR BLD AUTO: 40 % (ref 14–44)
MAGNESIUM SERPL-MCNC: 1.9 MG/DL (ref 1.9–2.7)
MCH RBC QN AUTO: 30.7 PG (ref 26.8–34.3)
MCHC RBC AUTO-ENTMCNC: 32.8 G/DL (ref 31.4–37.4)
MCV RBC AUTO: 94 FL (ref 82–98)
MONOCYTES # BLD AUTO: 0.75 THOUSAND/ÂΜL (ref 0.17–1.22)
MONOCYTES NFR BLD AUTO: 7 % (ref 4–12)
MV E'TISSUE VEL-SEP: 7 CM/S
MV PEAK A VEL: 0.84 M/S
MV PEAK E VEL: 81 CM/S
MV STENOSIS PRESSURE HALF TIME: 64 MS
MV VALVE AREA P 1/2 METHOD: 3.44
NEUTROPHILS # BLD AUTO: 5.8 THOUSANDS/ÂΜL (ref 1.85–7.62)
NEUTS SEG NFR BLD AUTO: 50 % (ref 43–75)
NRBC BLD AUTO-RTO: 0 /100 WBCS
P AXIS: 20 DEGREES
P AXIS: 76 DEGREES
PHOSPHATE SERPL-MCNC: 3.7 MG/DL (ref 2.7–4.5)
PLATELET # BLD AUTO: 228 THOUSANDS/UL (ref 149–390)
PMV BLD AUTO: 10.9 FL (ref 8.9–12.7)
POTASSIUM SERPL-SCNC: 4.1 MMOL/L (ref 3.5–5.3)
PR INTERVAL: 166 MS
PR INTERVAL: 168 MS
QRS AXIS: -5 DEGREES
QRS AXIS: 0 DEGREES
QRSD INTERVAL: 92 MS
QRSD INTERVAL: 98 MS
QT INTERVAL: 358 MS
QT INTERVAL: 396 MS
QTC INTERVAL: 415 MS
QTC INTERVAL: 439 MS
RBC # BLD AUTO: 4.75 MILLION/UL (ref 3.88–5.62)
RIGHT ATRIUM AREA SYSTOLE A4C: 13.4 CM2
RIGHT VENTRICLE ID DIMENSION: 3.5 CM
SL CV LEFT ATRIUM LENGTH A2C: 5.1 CM
SL CV LV EF: 55
SL CV PED ECHO LEFT VENTRICLE DIASTOLIC VOLUME (MOD BIPLANE) 2D: 141 ML
SL CV PED ECHO LEFT VENTRICLE SYSTOLIC VOLUME (MOD BIPLANE) 2D: 70 ML
SODIUM SERPL-SCNC: 138 MMOL/L (ref 135–147)
T WAVE AXIS: 76 DEGREES
T WAVE AXIS: 86 DEGREES
TRICUSPID ANNULAR PLANE SYSTOLIC EXCURSION: 2.6 CM
VENTRICULAR RATE: 74 BPM
VENTRICULAR RATE: 81 BPM
WBC # BLD AUTO: 11.3 THOUSAND/UL (ref 4.31–10.16)

## 2024-06-30 PROCEDURE — 72141 MRI NECK SPINE W/O DYE: CPT

## 2024-06-30 PROCEDURE — 99238 HOSP IP/OBS DSCHRG MGMT 30/<: CPT | Performed by: PSYCHIATRY & NEUROLOGY

## 2024-06-30 PROCEDURE — 83735 ASSAY OF MAGNESIUM: CPT

## 2024-06-30 PROCEDURE — 93306 TTE W/DOPPLER COMPLETE: CPT | Performed by: INTERNAL MEDICINE

## 2024-06-30 PROCEDURE — 93010 ELECTROCARDIOGRAM REPORT: CPT | Performed by: INTERNAL MEDICINE

## 2024-06-30 PROCEDURE — 82948 REAGENT STRIP/BLOOD GLUCOSE: CPT

## 2024-06-30 PROCEDURE — 80048 BASIC METABOLIC PNL TOTAL CA: CPT

## 2024-06-30 PROCEDURE — 93306 TTE W/DOPPLER COMPLETE: CPT

## 2024-06-30 PROCEDURE — 85025 COMPLETE CBC W/AUTO DIFF WBC: CPT

## 2024-06-30 PROCEDURE — 84100 ASSAY OF PHOSPHORUS: CPT

## 2024-06-30 RX ORDER — LISINOPRIL 10 MG/1
10 TABLET ORAL DAILY
Qty: 30 TABLET | Refills: 5 | Status: SHIPPED | OUTPATIENT
Start: 2024-07-01

## 2024-06-30 RX ORDER — ATORVASTATIN CALCIUM 40 MG/1
40 TABLET, FILM COATED ORAL EVERY EVENING
Qty: 30 TABLET | Refills: 5 | Status: SHIPPED | OUTPATIENT
Start: 2024-06-30

## 2024-06-30 RX ORDER — BLOOD-GLUCOSE METER
KIT MISCELLANEOUS
Qty: 1 KIT | Refills: 0 | Status: SHIPPED | OUTPATIENT
Start: 2024-06-30

## 2024-06-30 RX ORDER — AMLODIPINE BESYLATE 10 MG/1
10 TABLET ORAL DAILY
Qty: 30 TABLET | Refills: 5 | Status: SHIPPED | OUTPATIENT
Start: 2024-07-01

## 2024-06-30 RX ORDER — CLOPIDOGREL BISULFATE 75 MG/1
75 TABLET ORAL DAILY
Qty: 18 TABLET | Refills: 0 | Status: SHIPPED | OUTPATIENT
Start: 2024-07-01 | End: 2024-07-19

## 2024-06-30 RX ORDER — GLUCOSAMINE HCL/CHONDROITIN SU 500-400 MG
CAPSULE ORAL
Qty: 100 EACH | Refills: 0 | Status: SHIPPED | OUTPATIENT
Start: 2024-06-30

## 2024-06-30 RX ORDER — ADHESIVE BANDAGE 3/4"
BANDAGE TOPICAL DAILY
Qty: 1 EACH | Refills: 0 | Status: SHIPPED | OUTPATIENT
Start: 2024-06-30

## 2024-06-30 RX ORDER — LANCETS 33 GAUGE
EACH MISCELLANEOUS
Qty: 100 EACH | Refills: 0 | Status: SHIPPED | OUTPATIENT
Start: 2024-06-30

## 2024-06-30 RX ORDER — BLOOD SUGAR DIAGNOSTIC
STRIP MISCELLANEOUS
Qty: 100 EACH | Refills: 0 | Status: SHIPPED | OUTPATIENT
Start: 2024-06-30

## 2024-06-30 RX ORDER — ASPIRIN 81 MG/1
81 TABLET, CHEWABLE ORAL DAILY
Qty: 30 TABLET | Refills: 5 | Status: SHIPPED | OUTPATIENT
Start: 2024-07-01

## 2024-06-30 RX ADMIN — DOCUSATE SODIUM 100 MG: 100 CAPSULE, LIQUID FILLED ORAL at 10:11

## 2024-06-30 RX ADMIN — OLANZAPINE 5 MG: 5 TABLET, ORALLY DISINTEGRATING ORAL at 00:53

## 2024-06-30 RX ADMIN — LISINOPRIL 10 MG: 10 TABLET ORAL at 10:12

## 2024-06-30 RX ADMIN — ATORVASTATIN CALCIUM 40 MG: 40 TABLET, FILM COATED ORAL at 18:12

## 2024-06-30 RX ADMIN — CLOPIDOGREL BISULFATE 75 MG: 75 TABLET ORAL at 10:11

## 2024-06-30 RX ADMIN — ENOXAPARIN SODIUM 40 MG: 40 INJECTION SUBCUTANEOUS at 10:10

## 2024-06-30 RX ADMIN — AMLODIPINE BESYLATE 10 MG: 10 TABLET ORAL at 10:11

## 2024-06-30 RX ADMIN — LABETALOL HYDROCHLORIDE 10 MG: 5 INJECTION, SOLUTION INTRAVENOUS at 07:39

## 2024-06-30 RX ADMIN — ASPIRIN 81 MG CHEWABLE TABLET 81 MG: 81 TABLET CHEWABLE at 10:11

## 2024-06-30 NOTE — DISCHARGE INSTR - AVS FIRST PAGE
Dear Son Lafleur,     It was our pleasure to care for you here at [facility].  It is our hope that we were always able to exceed the expected standards for your care during your stay.  You were hospitalized due to stroke.  You were cared for on the 7th floor by Salud Monteiro DO under the service of Aron Jeff MD with the [facility] who covers for your primary care physician (PCP), No primary care provider on file., while you were hospitalized.  If you have any questions or concerns related to this hospitalization, you may contact us at .  For follow up as well as any medication refills, we recommend that you follow up with your primary care physician.  A registered nurse will reach out to you by phone within a few days after your discharge to answer any additional questions that you may have after going home.  However, at this time we provide for you here, the most important instructions / recommendations at discharge:     Notable Medication Adjustments -   Take Aspirin 81mg daily.  Take Plavix 75mg daily for an additional 18 dosages.  Take Atorvastatin 40mg daily for cholesterol management.  Take Metformin 500mg twice a day for diabetes management.  Take Amlodipine and Lisinopril for blood pressure management.  Testing Required after Discharge -   None  Important follow up information -   Schedule an appointment with a PCP.  Schedule an appointment with our stroke team.  Other Instructions -   Check blood pressure at least once a day especially in the next few weeks.  Recommend drinking at least 1/2 gallon of water daily.  Please review this entire after visit summary as additional general instructions including medication list, appointments, activity, diet, any pertinent wound care, and other additional recommendations from your care team that may be provided for you.      Sincerely,     Salud Monteiro DO

## 2024-06-30 NOTE — DISCHARGE SUMMARY
NEUROLOGY RESIDENCY - DISCHARGE SUMMARY     Name: Son Lafleur   Age & Sex: 54 y.o. male   MRN: [id number]  Unit/Bed#: [facility] 710-01   Encounter: [id number]    Discharging Resident Physician: Salud Monteiro DO  Attending: Aron Jeff MD  PCP: No primary care provider on file.  Admission Date: 6/28/2024  Discharge Date: 06/30/24    Will need follow up with neurovascular attending/AP/resident in 6-8 weeks (60 min).    ASSESSMENT & PLAN     * Stroke (cerebrum) (HCC)  Assessment & Plan  54-year-old male presenting after a transient episode of right-sided weakness and speech disturbance with total duration of 10 minutes. Upon arrival patient was asymptomatic with initial NIHSS of 0. Found to have an elevated initial BP of 169/141. Imaging consisting of CTA H/N revealed subacute to chronic right caudate and Td caudate white matter lacunar infarcts, mild cervical carotid atherosclerotic disease, severe tapering of the left supraclinoid ICA and markedly small carotid terminus as well as proximal CLAUDIA and MCA branches, severe focal stenosis at origin of the dominant right A1 segment, mild stenosis in the proximal right M1 segment, R >L PCA stenosis, and congenital and acquired cervical spinal stenosis with severe stenosis in the lower cervical spine.   Home Antiplatelet or Anticoagulants PTA:  None  Stroke risk factors: HTN, HLD, T2DM, elevated BMI  Prior stroke history: none. Past Neurological History: no neurological problems     Exam on 06/28/24: No focal deficits    MRI brain wo contrast, 6/29: Focal acute infarction in L anterior caudate body. Chronic hemorrhagic infarction and regional gliosis in R anterior caudate body.  TTE, 6/30: EF 55%, normal systolic fxn, G1DD, LA normal size, IVC normal size, no major valvular dysfunction  Telemetry: No evidence of cardiac arrhythmia    Lab Results   Component Value Date    CHOLESTEROL 202 (H) 06/28/2024    TRIG 124 06/28/2024    HDL 43 06/28/2024    LDLCALC 134 (H) 06/28/2024  "    Lab Results   Component Value Date    HGBA1C 7.1 (H) 06/28/2024     No results found for: \"SNU2YHEDGORF\", \"FREET4\"       Pertinent scores:  Initial NIHSS: 0  Modified Wilcox Score: 0 (No baseline symptoms/disability)     Impression: Evidence of acute infarction in L anterior caudate body, along with chronic hemorrhage infarction and regional gliosis in R anterior caudate body. Multiple vascular risk factors. Suspect small vessel disease.     Plan: Discussed plan with neurology attending, Dr. Jeff  S/p  x1 and Plavix 300 x1, followed by ASA 81mg daily and Plavix 75mg daily. DAPT x 21 days, and then ASA monotherapy.  Lipitor 40mg daily. LDL goal <7%.  Gradual decline to normotension.  Normoglycemia (<180), normothermia  A1c goal <7%.  PT/OT/ST - no rehab needs  Neurochecks  Monitored on telemetry  CM/nutrition consulted, appreciated assistance  Will need follow up with neurovascular attending/AP/resident in 6-8 weeks (60 min).  Ambulatory referral to sleep medicine provided to evaluate for sleep apnea.  Referral provided to establish care with a PCP.    Hyperlipidemia  Assessment & Plan  Atorvastatin 40mg daily    T2DM (type 2 diabetes mellitus) (HCC)  Assessment & Plan  Lab Results   Component Value Date    HGBA1C 7.1 (H) 06/28/2024       Recent Labs     06/28/24  1415 06/29/24  0841 06/29/24  1040 06/29/24  1609   POCGLU 194* 194* 119 140       Blood Sugar Average: Last 72 hrs:  (P) 161.75    Upon discharge, will likely start Metformin IR 500mg BID and order glucose check supplies  While inpatient, Accuchecks and SSI TID AC.    Hypertension  Assessment & Plan  Started Amlodipine 10mg daily and Lisinopril 10mg daily.  Monitor kidney functions closely due to URSULA on initial presentation.  PRN Hydralazine and Labetalol.             Disposition:     Home    Reason for Admission: transient dysarthria and RUE weakness    Consultations During Hospital Stay:  IP CONSULT TO CASE MANAGEMENT  IP CONSULT TO NUTRITION " SERVICES    Procedures Performed:   TTE    Significant Findings / Test Results:   Labs: Hemoglobin A1c 7.1 (6/28/2024),  (6/28/2024)    MRI cervical spine wo contrast    Result Date: 6/30/2024  Impression: Normal MRI of the cervical spine. No change. Multilevel moderate canal stenosis due to combination of disc osteophyte complexes, ossification of the posterior longitudinal ligament and mild underlying congenital canal stenosis. Workstation performed: [id number]     MRI brain wo contrast    Result Date: 6/29/2024  Impression: Focal acute infarction in the left anterior caudate body. Chronic hemorrhagic infarction and regional gliosis in the right anterior caudate body. The study was marked in EPIC for immediate notification. Workstation performed: [id number]     CTA head and neck with and without contrast    Result Date: 6/28/2024  Impression: BRAIN: No acute intracranial hemorrhage or cortical infarction. Subacute to chronic right caudate and pericaudate white matter lacunar infarcts. CTA NECK: Mild cervical carotid atherosclerotic disease. No hemodynamically significant stenosis at the carotid bifurcations. CTA HEAD: Severe tapering of the left supraclinoid ICA and markedly small carotid terminus as well as proximal CLAUDIA and MCA branches. Severe focal stenosis at the origin of the dominant right A1 segment.. Mild stenosis in the proximal right M1 segment. Right greater than left PCA stenosis. MISCELLANEOUS: Congenital and acquired cervical spinal stenosis with severe stenosis in the lower cervical spine. Nonemergent follow-up MRI imaging is recommended. Workstation performed: [id number]       Incidental Findings:   MRI brain wo contrast, 6/29: Chronic hemorrhagic infarction and regional gliosis in R anterior caudate body.     Test Results Pending at Discharge (will require follow up):   None     Outpatient Tests Requested:  None    Complications:  hypertensive urgency    Hospital Course:     Son Lafleur is a  "54 y.o. male patient who originally presented to the hospital on 6/28/2024 due to transient dysarthria and RUE weakness lasting about 10 minutes. Initially concerning for TIA; however, MRI brain demonstrate focal acute infarction in L anterior caudate body, along with evidence of chronic hemorrhagic infarction and regional gliosis in R anterior caudate body. Furthermore, CTA H/N demonstrated multivessel atherosclerotic disease. Hospital course was complicated by hypertensive urgency that required initiation of two oral antihypertensive agents for improved control. A1c also elevated at 7.1%. . Overall, neurological examination continued to remain normal during hospital course. Pt was deemed medically stable for discharge on 6/30. Plan as stated above.    Condition at Discharge: good     Discharge Day Visit / Exam:     Subjective:  No acute concerns.    Vitals: Blood Pressure: 149/76 (06/30/24 1529)  Pulse: 72 (06/30/24 1529)  Temperature: 97.6 °F (36.4 °C) (06/30/24 1529)  Temp Source: Oral (06/30/24 1529)  Respirations: 16 (06/30/24 1529)  Height: 5' 10\" (177.8 cm) (06/30/24 1004)  Weight - Scale: (!) 152 kg (335 lb) (06/30/24 1004)  SpO2: 99 % (06/30/24 1529)    Exam:     Physical Exam   Vitals reviewed.   Constitutional:    Not in acute distress. Normal appearance. Not ill-appearing, toxic-appearing or diaphoretic.   HENT:   Normocephalic and atraumatic.  External ear normal b/l. Nose normal. No congestion or rhinorrhea. Mucous membranes are moist.  Oropharynx is clear. No oropharyngeal exudate or posterior oropharyngeal erythema.   Eyes:    No scleral icterus.  No discharge b/l.  Conjunctivae normal.   Cardiovascular: no clear edema  Pulmonary:  No respiratory distress.   Musculoskeletal: no gross deformities  Skin:    Skin is not pale.   Psychiatric:      Mood normal. Affect congruent    Neurologic Exam   MENTAL STATUS: AAOx3. Follows simple/complex commands. Affect normal w/ congruent mood.    LANGUAGE: " Speech clear, spontaneous, and fluent. No aphasia -  naming, repetition, comprehension, reading, writing intact. No dysarthria - normal volume and intonation.    CRANIAL NERVES:  CN II: Visual acuity grossly intact. No visual field deficit. PERRLA.   CN III, IV, VI: EOM's intact w/o nystagmus, gaze palsy, or preference.   CN V: Normal masseter bulk. V1-V3 sensation intact and symmetric bilaterally.   CN VII: Face movement symmetric. Smile, eyebrow raise, eyelid bury symmetric. Nasolabial folds and palpebral folds symmetric.   CN VIII: Hearing grossly intact bilaterally.   CN IX-X: No dysarthria. Palate elevates symmetrically. Uvula midline.   CN XI: Shoulder shrug symmetric.   CN XII: Tongue midline. No deviation, atrophy, or fasciculations.    MOTOR: Normal muscle bulk. Normal tone. No pronator drift or orbiting. No tremors or abnormal involuntary movements appreciated. Formal strength testing as follows:  Upper extremity:   Shoulder abduction Elbow flexion Elbow extension Wrist flexion Wrist extension  strength   Right 5/5 5/5 5/5 5/5 5/5 5/5   Left 5/5 5/5 5/5 5/5 5/5 5/5     Lower extremity:   Hip flexion Knee flexion Knee extension Ankle dorsiflexion Ankle plantarflexion   Right 5/5 5/5 5/5 5/5 5/5   Left 5/5 5/5 5/5 5/5 5/5     SENSORY:  Light touch: Intact and symmetric throughout.    COORDINATION: Finger-to-nose w/ eyes open intact bilaterally w/o dysmetria or ataxia.     GAIT: Deferred       Discharge instructions/Information to patient and family:   See after visit summary for information provided to patient and family.      Provisions for Follow-Up Care:  See after visit summary for information related to follow-up care and any pertinent home health orders.      Planned Readmission: none    Discharge Statement:  I spent 30 minutes discharging the patient. This time was spent on the day of discharge. I had direct contact with the patient on the day of discharge. Greater than 50% of the total time was  spent examining patient, answering all patient questions, arranging and discussing plan of care with patient as well as directly providing post-discharge instructions.  Additional time then spent on discharge activities.      Discharge Medications:  See after visit summary for reconciled discharge medications provided to patient and family.      ** Please Note: This note has been constructed using a voice recognition system **      ======    I have discussed the patient's history, physical exam findings, assessment, and plan in detail with attending, Dr. Jeff    Thank you for allowing me to participate in the care of your patient, Son Lafleur.    Salud Monteiro,   [facility] Neurology Residency, PGY-2

## 2024-06-30 NOTE — UTILIZATION REVIEW
Initial Clinical Review    Admission: Date/Time/Statement:   Admission Orders (From admission, onward)       Ordered        06/28/24 1737  INPATIENT ADMISSION  Once                          Orders Placed This Encounter   Procedures    INPATIENT ADMISSION     Standing Status:   Standing     Number of Occurrences:   1     Order Specific Question:   Level of Care     Answer:   Med Surg [16]     Order Specific Question:   Estimated length of stay     Answer:   More than 2 Midnights     Order Specific Question:   Certification     Answer:   I certify that inpatient services are medically necessary for this patient for a duration of greater than two midnights. See H&P and MD Progress Notes for additional information about the patient's course of treatment.     ED Arrival Information       Expected   -    Arrival   6/28/2024 14:02    Acuity   Urgent              Means of arrival   Ambulance    Escorted by   [facility]    Service   Neurology    Admission type   Emergency              Arrival complaint   -             Chief Complaint   Patient presents with    Extremity Weakness     Patient reports eating lunch today and mid bite could not use right side of his body. Patient reports being unable to verbalize to coworkers what was going on. Boss stated patient had slurred speech. Patient symptoms resolved at this time.        Initial Presentation: 54 y.o. male who presented on 6/28 to [facility] ED via EMS due to transient right-sided weakness of the right arm and right leg (arm >leg) while at work. Also noted associated speech disturbance described as slurred speech and difficulty getting his words out. States that symptoms began abruptly. Total duration of 10 minutes. States that after episode completely returned back to his baseline. This is never happened in the past before. On exam, A&O x4, speech fluent, clear, coherent, can follow multistep commands.  Labs revealed WBC 22.61, Cr 1.31.   Imaging revealed CTA H/N revealed subacute to chronic right caudate and Td caudate white matter lacunar infarcts, mild cervical carotid atherosclerotic disease, severe tapering of the left supraclinoid ICA and markedly small carotid terminus as well as proximal CLAUDIA and MCA branches, severe focal stenosis at origin of the dominant right A1 segment, mild stenosis in the proximal right M1 segment, R >L PCA stenosis, and congenital and acquired cervical spinal stenosis with severe stenosis in the lower cervical spine. .      Plan:  Admit Inpatient status to Neurology dt Stroke: Med, surg, telemetry, Neurology consult, freq neuro checks and VS, baseline NIH stroke scale, order Echo and MRI brain, NPO, permissive HTN, accuchecks w/ SSI, order lipid panel, A1c, monitor IO and daily wts, PT OT ST eval, CM consult, inc spirometry.    6/26 Per Neurology: Initial NIHSS: 0. Transient right-sided weakness and speech disturbances with a presentation consistent with TIA and an ABCD2 score of 4,which corresponds to a 9.8% 90-day stroke risk. For secondary stroke prevention, reasonable to treat with DAPT x21 days per CHANCE/POINT trials and high intensity statin. Admit to stroke pathway, see above plan.     Date: 6/29   Day 2:  Per Neurology: Assessment remains unchanged, WNL. No new complaints or concerns today. MRI brain scan is showing acute left-sided subcortical stroke caudate. There is also found to have a chronic right caudate small vessel thrombotic stroke as well. He has multiple stroke risk factors including diabetes hypertension hyperlipidemia, DL at 130 4 May above goal of being in the 60s and hemoglobin A1c is elevated at 7.1. Patient has significant atherosclerosis more so in the intracranial and extracranial vessels however unlikely we are dealing with a symptomatic vessel causing an atheroembolic stroke. Is in subcortical region we tend to have small vessel thrombotic strokes lacunar variety. DAPT for 21 days with  aspirin Plavix and then aspirin monotherapy as patient was antiplatelet naïve prior to his admission.     ED Triage Vitals   Temperature Pulse Respirations Blood Pressure SpO2 Pain Score   06/28/24 2134 06/28/24 1408 06/28/24 1408 06/28/24 1408 06/28/24 1408 06/28/24 1811   97.9 °F (36.6 °C) 73 17 (!) 169/141 98 % No Pain     Weight (last 2 days)       Date/Time Weight    06/28/24 1811 152 (335)            Vital Signs (last 3 days)       Date/Time Temp Pulse Resp BP MAP (mmHg) SpO2 O2 Device Patient Position - Orthostatic VS Baltazar Coma Scale Score Pain    06/30/24 07:35:53 -- 69 -- 162/84 110 99 % -- -- -- --    06/30/24 04:50:47 -- 67 -- 175/97 123 99 % -- -- -- --    06/29/24 20:35:26 97.7 °F (36.5 °C) -- -- -- -- -- -- -- -- --    06/29/24 20:27:28 -- 68 -- 135/70 92 96 % -- -- 15 No Pain    06/29/24 15:40:10 97.8 °F (36.6 °C) 77 -- 160/92 115 97 % -- -- -- --    06/29/24 1401 -- -- 18 -- -- -- -- -- -- --    06/29/24 1335 -- 76 -- 161/96 118 98 % -- -- -- --    06/29/24 12:39:02 -- 78 -- 182/93 123 97 % -- -- -- --    06/29/24 1239 -- -- -- 182/93 -- -- -- -- -- --    06/29/24 1152 -- 82 -- -- -- -- -- -- -- --    06/29/24 1151 -- -- -- 155/92 113 -- -- -- -- --    06/29/24 1132 -- -- -- 180/101 -- -- -- -- -- --    06/29/24 11:31:27 -- 67 -- 180/101 127 99 % -- -- -- --    06/29/24 10:18:10 -- 70 -- 190/108 135 94 % -- -- -- --    06/29/24 0900 -- -- -- -- -- -- None (Room air) -- 15 No Pain    06/29/24 0849 -- -- -- -- -- -- -- -- -- 5    06/29/24 0848 -- -- -- -- -- -- -- -- -- 5 06/29/24 07:43:51 97.4 °F (36.3 °C) 68 -- 172/100 124 98 % -- -- -- --    06/29/24 0600 -- -- -- -- -- -- -- -- 15 --    06/29/24 0549 -- 68 -- 191/112 138 97 % -- -- -- --    06/29/24 04:43:38 -- 79 -- 197/109 138 98 % -- -- -- --    06/29/24 0400 -- -- -- -- -- -- -- -- 15 --    06/29/24 0000 -- -- -- -- -- -- -- -- 15 --    06/28/24 21:34:25 97.9 °F (36.6 °C) 75 17 112/95 101 98 % None (Room air) Lying -- --    06/28/24 2041  -- -- -- -- -- -- -- -- -- No Pain    06/28/24 2028 -- -- -- -- -- -- -- -- 15 --    06/28/24 1900 -- -- -- -- -- -- -- -- -- No Pain    06/28/24 1811 -- -- 20 -- -- -- -- -- -- No Pain    06/28/24 1800 -- -- -- -- -- -- -- -- 15 --    06/28/24 1630 -- -- -- -- -- -- -- -- 15 --    06/28/24 1600 -- 86 20 153/73 104 98 % None (Room air) -- -- --    06/28/24 1430 -- 76 20 169/78 112 98 % None (Room air) -- -- --    06/28/24 1412 -- -- -- -- -- -- None (Room air) -- -- --    06/28/24 1411 -- -- -- -- -- -- -- -- 15 --    06/28/24 1408 -- 73 17 169/141 -- 98 % None (Room air) Lying -- --              Pertinent Labs/Diagnostic Test Results:   Radiology:  MRI brain wo contrast   Final Interpretation by Pallav N Shah, MD (06/29 1628)      Focal acute infarction in the left anterior caudate body.      Chronic hemorrhagic infarction and regional gliosis in the right anterior caudate body.      The study was marked in EPIC for immediate notification.      Workstation performed: [id number]         CTA head and neck with and without contrast   Final Interpretation by Pallav N Shah, MD (06/28 1632)      BRAIN: No acute intracranial hemorrhage or cortical infarction. Subacute to chronic right caudate and pericaudate white matter lacunar infarcts.      CTA NECK: Mild cervical carotid atherosclerotic disease. No hemodynamically significant stenosis at the carotid bifurcations.      CTA HEAD: Severe tapering of the left supraclinoid ICA and markedly small carotid terminus as well as proximal CLAUDIA and MCA branches. Severe focal stenosis at the origin of the dominant right A1 segment.. Mild stenosis in the proximal right M1 segment.    Right greater than left PCA stenosis.      MISCELLANEOUS: Congenital and acquired cervical spinal stenosis with severe stenosis in the lower cervical spine. Nonemergent follow-up MRI imaging is recommended.                              Workstation performed: [id number]         MRI cervical spine wo  contrast    (Results Pending)     Cardiology:  ECG 12 lead   Final Result by Ming De La Vega MD (06/30 0217)   Normal sinus rhythm   Moderate voltage criteria for LVH, may be normal variant   Nonspecific T wave abnormality   Abnormal ECG   When compared with ECG of 28-JUN-2024 14:11, (unconfirmed)   No significant change was found   Confirmed by Ming De La Vega ([id number]) on 6/30/2024 2:17:55 AM        GI:  No orders to display           Results from last 7 days   Lab Units 06/30/24 0453 06/29/24 0449 06/28/24 2035 06/28/24  1456   WBC Thousand/uL 11.30* 14.02*  --  22.61*   HEMOGLOBIN g/dL 14.6 14.6  --  14.2   HEMATOCRIT % 44.5 44.3  --  43.7   PLATELETS Thousands/uL 228 227 215 248   TOTAL NEUT ABS Thousands/µL 5.80  --   --  16.69*         Results from last 7 days   Lab Units 06/30/24 0453 06/29/24 0449 06/28/24  1456   SODIUM mmol/L 138 140 137   POTASSIUM mmol/L 4.1 4.2 4.0   CHLORIDE mmol/L 102 105 103   CO2 mmol/L 30 25 25   ANION GAP mmol/L 6 10 9   BUN mg/dL 17 21 21   CREATININE mg/dL 1.13 1.07 1.31*   EGFR ml/min/1.73sq m 73 78 61   CALCIUM mg/dL 8.6 8.8 9.2   MAGNESIUM mg/dL 1.9  --   --    PHOSPHORUS mg/dL 3.7  --   --      Results from last 7 days   Lab Units 06/29/24 0449 06/28/24  1456   AST U/L 23 26   ALT U/L 22 22   ALK PHOS U/L 35 45   TOTAL PROTEIN g/dL 6.7 7.1   ALBUMIN g/dL 3.5 3.7   TOTAL BILIRUBIN mg/dL 0.33 0.28     Results from last 7 days   Lab Units 06/30/24  0633 06/29/24  2116 06/29/24  1609 06/29/24  1040 06/29/24  0841 06/28/24  1415   POC GLUCOSE mg/dl 119 163* 140 119 194* 194*     Results from last 7 days   Lab Units 06/30/24 0453 06/29/24 0449 06/28/24  1456   GLUCOSE RANDOM mg/dL 116 130 174*         Results from last 7 days   Lab Units 06/28/24  1456   HEMOGLOBIN A1C % 7.1*   EAG mg/dl 157     ED Treatment-Medication Administration from 06/28/2024 1402 to 06/28/2024 1802         Date/Time Order Dose Route Action     06/28/2024 1612 sodium chloride 0.9 % bolus 1,000 mL  1,000 mL Intravenous New Bag     06/28/2024 1553 iohexol (OMNIPAQUE) 350 MG/ML injection (SINGLE-DOSE) 85 mL 85 mL Intravenous Given     06/28/2024 1715 aspirin tablet 325 mg 325 mg Oral Given            History reviewed. No pertinent past medical history.  Present on Admission:  **None**      Admitting Diagnosis: TIA (transient ischemic attack) [G45.9]  Weakness [R53.1]  Carotid stenosis [I65.29]  Lacunar infarct, acute (HCC) [I63.81]  Weakness of extremity [R29.898]  Age/Sex: 54 y.o. male  Admission Orders:  Scheduled Medications:  amLODIPine, 10 mg, Oral, Daily  aspirin, 81 mg, Oral, Daily  atorvastatin, 40 mg, Oral, QPM  clopidogrel, 75 mg, Oral, Daily  docusate sodium, 100 mg, Oral, BID  enoxaparin, 40 mg, Subcutaneous, Daily  insulin lispro, 2-12 Units, Subcutaneous, TID AC  lisinopril, 10 mg, Oral, Daily      Continuous IV Infusions: none     PRN Meds:  hydrALAZINE, 5 mg, Intravenous, Q6H PRN x1  labetalol, 10 mg, Intravenous, Q4H PRN x1  LORazepam, 1 mg, Intravenous, Once PRN  LORazepam, 2 mg, Intravenous, Once PRN x1        IP CONSULT TO CASE MANAGEMENT  IP CONSULT TO NUTRITION SERVICES    Network Utilization Review Department  ATTENTION: Please call with any questions or concerns to [phone] and carefully listen to the prompts so that you are directed to the right person. All voicemails are confidential.   For Discharge needs, contact Care Management DC Support Team at [phone] opt. [phone]  Send all requests for admission clinical reviews, approved or denied determinations and any other requests to dedicated fax number below belonging to the campus where the patient is receiving treatment. List of dedicated fax numbers for the Facilities:  FACILITY NAME UR FAX NUMBER   ADMISSION DENIALS (Administrative/Medical Necessity) [phone]   DISCHARGE SUPPORT TEAM (NETWORK) [phone]   PARENT CHILD HEALTH (Maternity/NICU/Pediatrics) [phone]   [facility] [phone]   [facility]  General acute hospital [phone]   [facility] [phone]   [facility] [phone]   [facility] [phone]   [facility] [phone]   [facility] [phone]   [facility] [phone]   [facility] [phone]   [facility] [phone]   [facility] [phone]   [facility] [phone]

## 2024-06-30 NOTE — PLAN OF CARE
Problem: Neurological Deficit  Goal: Neurological status is stable or improving  Description: Interventions:  - Monitor and assess patient's level of consciousness, motor function, sensory function, and level of assistance needed for ADLs.   - Monitor and report changes from baseline. Collaborate with interdisciplinary team to initiate plan and implement interventions as ordered.   - Provide and maintain a safe environment.  - Consider seizure precautions.  - Consider fall precautions.  - Consider aspiration precautions.  - Consider bleeding precautions.  Outcome: Progressing     Problem: Activity Intolerance/Impaired Mobility  Goal: Mobility/activity is maintained at optimum level for patient  Description: Interventions:  - Assess and monitor patient  barriers to mobility and need for assistive/adaptive devices.  - Assess patient's emotional response to limitations.  - Collaborate with interdisciplinary team and initiate plans and interventions as ordered.  - Encourage independent activity per ability.  - Maintain proper body alignment.  - Perform active/passive rom as tolerated/ordered.  - Plan activities to conserve energy.  - Turn patient as appropriate  Outcome: Progressing     Problem: Communication Impairment  Goal: Ability to express needs and understand communication  Description: Assess patient's communication skills and ability to understand information.  Patient will demonstrate use of effective communication techniques, alternative methods of communication and understanding even if not able to speak.     - Encourage communication and provide alternate methods of communication as needed.  - Collaborate with case management/ for discharge needs.  - Include patient/family/caregiver in decisions related to communication.  Outcome: Progressing     Problem: Potential for Aspiration  Goal: Non-ventilated patient's risk of aspiration is minimized  Description: Assess and monitor vital signs,  Improving, monitor with daily labs.    Sodium 135   respiratory status, and labs (WBC).  Monitor for signs of aspiration (tachypnea, cough, rales, wheezing, cyanosis, fever).    - Assess and monitor patient's ability to swallow.  - Place patient up in chair to eat if possible.  - HOB up at 90 degrees to eat if unable to get patient up into chair.  - Supervise patient during oral intake.   - Instruct patient/ family to take small bites.  - Instruct patient/ family to take small single sips when taking liquids.  - Follow patient-specific strategies generated by speech pathologist.  Outcome: Progressing  Goal: Ventilated patient's risk of aspiration is minimized  Description: Assess and monitor vital signs, respiratory status, airway cuff pressure, and labs (WBC).  Monitor for signs of aspiration (tachypnea, cough, rales, wheezing, cyanosis, fever).    - Elevate head of bed 30 degrees if patient has tube feeding.  - Monitor tube feeding.  Outcome: Progressing     Problem: Nutrition  Goal: Nutrition/Hydration status is improving  Description: Monitor and assess patient's nutrition/hydration status for malnutrition (ex- brittle hair, bruises, dry skin, pale skin and conjunctiva, muscle wasting, smooth red tongue, and disorientation). Collaborate with interdisciplinary team and initiate plan and interventions as ordered.  Monitor patient's weight and dietary intake as ordered or per policy. Utilize nutrition screening tool and intervene per policy. Determine patient's food preferences and provide high-protein, high-caloric foods as appropriate.     - Assist patient with eating.  - Allow adequate time for meals.  - Encourage patient to take dietary supplement as ordered.  - Collaborate with clinical nutritionist.  - Include patient/family/caregiver in decisions related to nutrition.  Outcome: Progressing     Problem: PAIN - ADULT  Goal: Verbalizes/displays adequate comfort level or baseline comfort level  Description: Interventions:  - Encourage patient to monitor pain and  request assistance  - Assess pain using appropriate pain scale  - Administer analgesics based on type and severity of pain and evaluate response  - Implement non-pharmacological measures as appropriate and evaluate response  - Consider cultural and social influences on pain and pain management  - Notify physician/advanced practitioner if interventions unsuccessful or patient reports new pain  Outcome: Progressing     Problem: INFECTION - ADULT  Goal: Absence or prevention of progression during hospitalization  Description: INTERVENTIONS:  - Assess and monitor for signs and symptoms of infection  - Monitor lab/diagnostic results  - Monitor all insertion sites, i.e. indwelling lines, tubes, and drains  - Monitor endotracheal if appropriate and nasal secretions for changes in amount and color  - [place] appropriate cooling/warming therapies per order  - Administer medications as ordered  - Instruct and encourage patient and family to use good hand hygiene technique  - Identify and instruct in appropriate isolation precautions for identified infection/condition  Outcome: Progressing  Goal: Absence of fever/infection during neutropenic period  Description: INTERVENTIONS:  - Monitor WBC    Outcome: Progressing     Problem: SAFETY ADULT  Goal: Patient will remain free of falls  Description: INTERVENTIONS:  - Educate patient/family on patient safety including physical limitations  - Instruct patient to call for assistance with activity   - Consult OT/PT to assist with strengthening/mobility   - Keep Call bell within reach  - Keep bed low and locked with side rails adjusted as appropriate  - Keep care items and personal belongings within reach  - Initiate and maintain comfort rounds  - Make Fall Risk Sign visible to staff  - Offer Toileting every  Hours, in advance of need  - Initiate/Maintain alarm  - Obtain necessary fall risk management equipment:   - Apply yellow socks and bracelet for high fall risk patients  -  Consider moving patient to room near nurses station  Outcome: Progressing  Goal: Maintain or return to baseline ADL function  Description: INTERVENTIONS:  -  Assess patient's ability to carry out ADLs; assess patient's baseline for ADL function and identify physical deficits which impact ability to perform ADLs (bathing, care of mouth/teeth, toileting, grooming, dressing, etc.)  - Assess/evaluate cause of self-care deficits   - Assess range of motion  - Assess patient's mobility; develop plan if impaired  - Assess patient's need for assistive devices and provide as appropriate  - Encourage maximum independence but intervene and supervise when necessary  - Involve family in performance of ADLs  - Assess for home care needs following discharge   - Consider OT consult to assist with ADL evaluation and planning for discharge  - Provide patient education as appropriate  Outcome: Progressing  Goal: Maintains/Returns to pre admission functional level  Description: INTERVENTIONS:  - Perform AM-PAC 6 Click Basic Mobility/ Daily Activity assessment daily.  - Set and communicate daily mobility goal to care team and patient/family/caregiver.   - Collaborate with rehabilitation services on mobility goals if consulted  - Perform Range of Motion  times a day.  - Reposition patient every  hours.  - Dangle patient  times a day  - Stand patient  times a day  - Ambulate patient  times a day  - Out of bed to chair  times a day   - Out of bed for meals  times a day  - Out of bed for toileting  - Record patient progress and toleration of activity level   Outcome: Progressing     Problem: DISCHARGE PLANNING  Goal: Discharge to home or other facility with appropriate resources  Description: INTERVENTIONS:  - Identify barriers to discharge w/patient and caregiver  - Arrange for needed discharge resources and transportation as appropriate  - Identify discharge learning needs (meds, wound care, etc.)  - Arrange for interpretive services to  assist at discharge as needed  - Refer to Case Management Department for coordinating discharge planning if the patient needs post-hospital services based on physician/advanced practitioner order or complex needs related to functional status, cognitive ability, or social support system  Outcome: Progressing     Problem: Knowledge Deficit  Goal: Patient/family/caregiver demonstrates understanding of disease process, treatment plan, medications, and discharge instructions  Description: Complete learning assessment and assess knowledge base.  Interventions:  - Provide teaching at level of understanding  - Provide teaching via preferred learning methods  Outcome: Progressing     Problem: Nutrition/Hydration-ADULT  Goal: Nutrient/Hydration intake appropriate for improving, restoring or maintaining nutritional needs  Description: Monitor and assess patient's nutrition/hydration status for malnutrition. Collaborate with interdisciplinary team and initiate plan and interventions as ordered.  Monitor patient's weight and dietary intake as ordered or per policy. Utilize nutrition screening tool and intervene as necessary. Determine patient's food preferences and provide high-protein, high-caloric foods as appropriate.     INTERVENTIONS:  - Monitor oral intake, urinary output, labs, and treatment plans  - Assess nutrition and hydration status and recommend course of action  - Evaluate amount of meals eaten  - Assist patient with eating if necessary   - Allow adequate time for meals  - Recommend/ encourage appropriate diets, oral nutritional supplements, and vitamin/mineral supplements  - Order, calculate, and assess calorie counts as needed  - Recommend, monitor, and adjust tube feedings and TPN/PPN based on assessed needs  - Assess need for intravenous fluids  - Provide specific nutrition/hydration education as appropriate  - Include patient/family/caregiver in decisions related to nutrition  Outcome: Progressing     Problem:  NEUROSENSORY - ADULT  Goal: Achieves stable or improved neurological status  Description: INTERVENTIONS  - Monitor and report changes in neurological status  - Monitor vital signs such as temperature, blood pressure, glucose, and any other labs ordered   - Initiate measures to prevent increased intracranial pressure  - Monitor for seizure activity and implement precautions if appropriate      Outcome: Progressing  Goal: Remains free of injury related to seizures activity  Description: INTERVENTIONS  - Maintain airway, patient safety  and administer oxygen as ordered  - Monitor patient for seizure activity, document and report duration and description of seizure to physician/advanced practitioner  - If seizure occurs,  ensure patient safety during seizure  - Reorient patient post seizure  - Seizure pads on all 4 side rails  - Instruct patient/family to notify RN of any seizure activity including if an aura is experienced  - Instruct patient/family to call for assistance with activity based on nursing assessment  - Administer anti-seizure medications if ordered    Outcome: Progressing  Goal: Achieves maximal functionality and self care  Description: INTERVENTIONS  - Monitor swallowing and airway patency with patient fatigue and changes in neurological status  - Encourage and assist patient to increase activity and self care.   - Encourage visually impaired, hearing impaired and aphasic patients to use assistive/communication devices  Outcome: Progressing     Problem: MUSCULOSKELETAL - ADULT  Goal: Maintain or return mobility to safest level of function  Description: INTERVENTIONS:  - Assess patient's ability to carry out ADLs; assess patient's baseline for ADL function and identify physical deficits which impact ability to perform ADLs (bathing, care of mouth/teeth, toileting, grooming, dressing, etc.)  - Assess/evaluate cause of self-care deficits   - Assess range of motion  - Assess patient's mobility  - Assess  patient's need for assistive devices and provide as appropriate  - Encourage maximum independence but intervene and supervise when necessary  - Involve family in performance of ADLs  - Assess for home care needs following discharge   - Consider OT consult to assist with ADL evaluation and planning for discharge  - Provide patient education as appropriate  Outcome: Progressing  Goal: Maintain proper alignment of affected body part  Description: INTERVENTIONS:  - Support, maintain and protect limb and body alignment  - Provide patient/ family with appropriate education  Outcome: Progressing

## 2024-06-30 NOTE — CASE MANAGEMENT
Case Management Assessment & Discharge Planning Note    Patient name Son Lafleur  Location [facility]-01 MRN [id number]  : 1970 Date 2024       Current Admission Date: 2024  Current Admission Diagnosis:Stroke (cerebrum) ([facility])   Patient Active Problem List    Diagnosis Date Noted Date Diagnosed    Stroke (cerebrum) (HCC) 2024     Hypertension 2024     T2DM (type 2 diabetes mellitus) ([facility]) 2024     Hyperlipidemia 2024       LOS (days): 2  Geometric Mean LOS (GMLOS) (days):   Days to GMLOS:     OBJECTIVE:    Risk of Unplanned Readmission Score: 11.03         Current admission status: Inpatient       Preferred Pharmacy:   [id number] - [place], [place] - [address]  [place] [address]  Phone: [phone] Fax: [phone]    Primary Care Provider: No primary care provider on file.    Primary Insurance: [facility]  Secondary Insurance:     ASSESSMENT:  Active Health Care Proxies    There are no active Health Care Proxies on file.                      Patient Information  Admitted from:: Home  Mental Status: Alert  During Assessment patient was accompanied by: Not accompanied during assessment  Assessment information provided by:: Patient  Primary Caregiver: Self  Support Systems: Family members, Self, Friend  What city do you live in?: [place]  Home entry access options. Select all that apply.: Stairs  Number of steps to enter home.: 5  Do the steps have railings?: Yes  Type of Current Residence: [address] [place] home  Upon entering residence, is there a bedroom on the main floor (no further steps)?: No  A bedroom is located on the following floor levels of residence (select all that apply):: 2nd Floor  Upon entering residence, is there a bathroom on the main floor (no further steps)?: No  Number of steps to 2nd floor from main floor: One Flight  Living Arrangements: Lives w/ Friend    Activities of Daily Living Prior to  Admission  Functional Status: Independent  Completes ADLs independently?: Yes  Ambulates independently?: Yes  Does patient use assisted devices?: No  Does patient currently own DME?: No  Does patient have a history of Outpatient Therapy (PT/OT)?: No  Does the patient have a history of Short-Term Rehab?: No  Does patient have a history of HHC?: No  Does patient currently have HHC?: No         Patient Information Continued  Does patient have prescription coverage?: Yes  Does patient receive dialysis treatments?: No         Means of Transportation  Means of Transport to Appts:: Public Transportation - Bus      Social Determinants of Health (SDOH)      Flowsheet Row Most Recent Value   Housing Stability    In the last 12 months, was there a time when you were not able to pay the mortgage or rent on time? N   In the past 12 months, how many times have you moved where you were living? 1   At any time in the past 12 months, were you homeless or living in a shelter (including now)? N   Transportation Needs    In the past 12 months, has lack of transportation kept you from medical appointments or from getting medications? no   In the past 12 months, has lack of transportation kept you from meetings, work, or from getting things needed for daily living? No   Food Insecurity    Within the past 12 months, you worried that your food would run out before you got the money to buy more. Never true   Within the past 12 months, the food you bought just didn't last and you didn't have money to get more. Never true   Utilities    In the past 12 months has the electric, gas, oil, or water company threatened to shut off services in your home? No            DISCHARGE DETAILS:    Discharge planning discussed with:: Pt  Freedom of Choice: Yes     CM contacted family/caregiver?: No- see comments             Contacts  Patient Contacts: Nicolas Lafleur  Relationship to Patient:: Family  Contact Method: Phone  Phone Number: [phone]  (Mobile)  Reason/Outcome: Emergency Contact                                                                     Additional Comments: Initial CM assessment completed with pt. No questions for Cm at this time.

## 2024-06-30 NOTE — PLAN OF CARE
Problem: Neurological Deficit  Goal: Neurological status is stable or improving  Description: Interventions:  - Monitor and assess patient's level of consciousness, motor function, sensory function, and level of assistance needed for ADLs.   - Monitor and report changes from baseline. Collaborate with interdisciplinary team to initiate plan and implement interventions as ordered.   - Provide and maintain a safe environment.  - Consider seizure precautions.  - Consider fall precautions.  - Consider aspiration precautions.  - Consider bleeding precautions.  Outcome: Progressing     Problem: Activity Intolerance/Impaired Mobility  Goal: Mobility/activity is maintained at optimum level for patient  Description: Interventions:  - Assess and monitor patient  barriers to mobility and need for assistive/adaptive devices.  - Assess patient's emotional response to limitations.  - Collaborate with interdisciplinary team and initiate plans and interventions as ordered.  - Encourage independent activity per ability.  - Maintain proper body alignment.  - Perform active/passive rom as tolerated/ordered.  - Plan activities to conserve energy.  - Turn patient as appropriate  Outcome: Progressing     Problem: Communication Impairment  Goal: Ability to express needs and understand communication  Description: Assess patient's communication skills and ability to understand information.  Patient will demonstrate use of effective communication techniques, alternative methods of communication and understanding even if not able to speak.     - Encourage communication and provide alternate methods of communication as needed.  - Collaborate with case management/ for discharge needs.  - Include patient/family/caregiver in decisions related to communication.  Outcome: Progressing     Problem: Potential for Aspiration  Goal: Non-ventilated patient's risk of aspiration is minimized  Description: Assess and monitor vital signs,  respiratory status, and labs (WBC).  Monitor for signs of aspiration (tachypnea, cough, rales, wheezing, cyanosis, fever).    - Assess and monitor patient's ability to swallow.  - Place patient up in chair to eat if possible.  - HOB up at 90 degrees to eat if unable to get patient up into chair.  - Supervise patient during oral intake.   - Instruct patient/ family to take small bites.  - Instruct patient/ family to take small single sips when taking liquids.  - Follow patient-specific strategies generated by speech pathologist.  Outcome: Progressing  Goal: Ventilated patient's risk of aspiration is minimized  Description: Assess and monitor vital signs, respiratory status, airway cuff pressure, and labs (WBC).  Monitor for signs of aspiration (tachypnea, cough, rales, wheezing, cyanosis, fever).    - Elevate head of bed 30 degrees if patient has tube feeding.  - Monitor tube feeding.  Outcome: Progressing     Problem: Nutrition  Goal: Nutrition/Hydration status is improving  Description: Monitor and assess patient's nutrition/hydration status for malnutrition (ex- brittle hair, bruises, dry skin, pale skin and conjunctiva, muscle wasting, smooth red tongue, and disorientation). Collaborate with interdisciplinary team and initiate plan and interventions as ordered.  Monitor patient's weight and dietary intake as ordered or per policy. Utilize nutrition screening tool and intervene per policy. Determine patient's food preferences and provide high-protein, high-caloric foods as appropriate.     - Assist patient with eating.  - Allow adequate time for meals.  - Encourage patient to take dietary supplement as ordered.  - Collaborate with clinical nutritionist.  - Include patient/family/caregiver in decisions related to nutrition.  Outcome: Progressing     Problem: PAIN - ADULT  Goal: Verbalizes/displays adequate comfort level or baseline comfort level  Description: Interventions:  - Encourage patient to monitor pain and  request assistance  - Assess pain using appropriate pain scale  - Administer analgesics based on type and severity of pain and evaluate response  - Implement non-pharmacological measures as appropriate and evaluate response  - Consider cultural and social influences on pain and pain management  - Notify physician/advanced practitioner if interventions unsuccessful or patient reports new pain  Outcome: Progressing

## 2024-07-01 NOTE — UTILIZATION REVIEW
NOTIFICATION OF INPATIENT ADMISSION      AUTHORIZATION REQUEST   SERVICING FACILITY:   [facility]  Address: [address] [place] [address]  Tax ID: [id number]  NPI: [id number] ATTENDING PROVIDER:  Attending Name and NPI#: Aron Jeff Md [[id number]]  Address: [address] [place] [address]  Phone: [phone]   ADMISSION INFORMATION:  Place of Service: Inpatient Acute [facility]  Place of Service Code: 21  Inpatient Admission Date/Time: 6/28/24  5:37 PM  Discharge Date/Time: 6/30/2024  6:38 PM  Admitting Diagnosis Code/Description:  TIA (transient ischemic attack) [G45.9]  Weakness [R53.1]  Carotid stenosis [I65.29]  Lacunar infarct, acute (HCC) [I63.81]  Weakness of extremity [R29.898]     UTILIZATION REVIEW CONTACT:  Rica Maya, Utilization   Network Utilization Review Department  Phone: [phone]  Fax: [phone]  Email: [email or web address]  Contact for approvals/pending authorizations, clinical reviews, and discharge.     PHYSICIAN ADVISORY SERVICES:  Medical Necessity Denial & Wutb-mr-Ehlx Review  Phone: [phone]  Fax: [phone]  Email: [email or web address]     DISCHARGE SUPPORT TEAM:  For Patients Discharge Needs & Updates  Phone: [phone] opt. 2 Fax: [phone]  Email: [email or web address]

## 2024-09-18 ENCOUNTER — TELEPHONE (OUTPATIENT)
Age: 54
End: 2024-09-18

## 2024-09-18 NOTE — TELEPHONE ENCOUNTER
"Phone call from patient calling Diabetic Education to schedule an appt for Family Medicine. Upon reviewing chart informed patient that \"he had a referral for Diabetic Education\", however patient stated \"he had to prioritize since he had no meds, and needed appt with Family Medicine\". Referral in chart for Family Medicine - transferred patient to Family Medicine POD for further assistance.    At the present time no appts scheduled with Diabetes Education.   "

## 2024-09-24 ENCOUNTER — OFFICE VISIT (OUTPATIENT)
Dept: FAMILY MEDICINE CLINIC | Facility: CLINIC | Age: 54
End: 2024-09-24
Payer: COMMERCIAL

## 2024-09-24 VITALS
TEMPERATURE: 98 F | WEIGHT: 314 LBS | BODY MASS INDEX: 45.05 KG/M2 | SYSTOLIC BLOOD PRESSURE: 120 MMHG | HEART RATE: 82 BPM | DIASTOLIC BLOOD PRESSURE: 59 MMHG | OXYGEN SATURATION: 98 %

## 2024-09-24 DIAGNOSIS — Z12.5 PROSTATE CANCER SCREENING: ICD-10-CM

## 2024-09-24 DIAGNOSIS — Z12.11 ENCOUNTER FOR SCREENING COLONOSCOPY: ICD-10-CM

## 2024-09-24 DIAGNOSIS — E78.5 HYPERLIPIDEMIA: ICD-10-CM

## 2024-09-24 DIAGNOSIS — I10 PRIMARY HYPERTENSION: ICD-10-CM

## 2024-09-24 DIAGNOSIS — I63.81 LACUNAR INFARCT, ACUTE (HCC): ICD-10-CM

## 2024-09-24 DIAGNOSIS — E78.00 PURE HYPERCHOLESTEROLEMIA: ICD-10-CM

## 2024-09-24 DIAGNOSIS — E11.9 TYPE 2 DIABETES MELLITUS WITHOUT COMPLICATION, WITHOUT LONG-TERM CURRENT USE OF INSULIN (HCC): ICD-10-CM

## 2024-09-24 DIAGNOSIS — G47.33 OSA (OBSTRUCTIVE SLEEP APNEA): ICD-10-CM

## 2024-09-24 DIAGNOSIS — Z11.59 NEED FOR HEPATITIS C SCREENING TEST: ICD-10-CM

## 2024-09-24 DIAGNOSIS — Z11.4 ENCOUNTER FOR SCREENING FOR HIV: ICD-10-CM

## 2024-09-24 DIAGNOSIS — Z00.00 ANNUAL PHYSICAL EXAM: Primary | ICD-10-CM

## 2024-09-24 DIAGNOSIS — Z82.49 FAMILY HISTORY OF HEART ATTACK: ICD-10-CM

## 2024-09-24 DIAGNOSIS — B35.1 ONYCHOMYCOSIS: ICD-10-CM

## 2024-09-24 DIAGNOSIS — D72.829 LEUKOCYTOSIS, UNSPECIFIED TYPE: ICD-10-CM

## 2024-09-24 DIAGNOSIS — Z13.228 SCREENING FOR METABOLIC DISORDER: ICD-10-CM

## 2024-09-24 LAB — SL AMB POCT HEMOGLOBIN AIC: 6.5 (ref ?–6.5)

## 2024-09-24 PROCEDURE — 83036 HEMOGLOBIN GLYCOSYLATED A1C: CPT | Performed by: FAMILY MEDICINE

## 2024-09-24 PROCEDURE — 99386 PREV VISIT NEW AGE 40-64: CPT | Performed by: FAMILY MEDICINE

## 2024-09-24 RX ORDER — TERBINAFINE HYDROCHLORIDE 250 MG/1
250 TABLET ORAL DAILY
Qty: 30 TABLET | Refills: 2 | Status: SHIPPED | OUTPATIENT
Start: 2024-09-24 | End: 2024-12-23

## 2024-09-24 RX ORDER — ATORVASTATIN CALCIUM 40 MG/1
40 TABLET, FILM COATED ORAL EVERY EVENING
Qty: 30 TABLET | Refills: 5 | Status: SHIPPED | OUTPATIENT
Start: 2024-09-24

## 2024-09-24 NOTE — ASSESSMENT & PLAN NOTE
- Stable   - A1C goal of <7%  Lab Results   Component Value Date    HGBA1C 6.5 09/24/2024   Plan  - Continue current medications     Orders:    Albumin / creatinine urine ratio; Future    Comprehensive metabolic panel; Future    Ambulatory Referral to Ophthalmology; Future    POCT hemoglobin A1c

## 2024-09-24 NOTE — PATIENT INSTRUCTIONS
"Patient Education     Routine physical for adults   The Basics   Written by the doctors and editors at [facility]   What is a physical? -- A physical is a routine visit, or \"check-up,\" with your doctor. You might also hear it called a \"wellness visit\" or \"preventive visit.\"  During each visit, the doctor will:   Ask about your physical and mental health   Ask about your habits, behaviors, and lifestyle   Do an exam   Give you vaccines if needed   Talk to you about any medicines you take   Give advice about your health   Answer your questions  Getting regular check-ups is an important part of taking care of your health. It can help your doctor find and treat any problems you have. But it's also important for preventing health problems.  A routine physical is different from a \"sick visit.\" A sick visit is when you see a doctor because of a health concern or problem. Since physicals are scheduled ahead of time, you can think about what you want to ask the doctor.  How often should I get a physical? -- It depends on your age and health. In general, for people age 21 years and older:   If you are younger than 50 years, you might be able to get a physical every 3 years.   If you are 50 years or older, your doctor might recommend a physical every year.  If you have an ongoing health condition, like diabetes or high blood pressure, your doctor will probably want to see you more often.  What happens during a physical? -- In general, each visit will include:   Physical exam - The doctor or nurse will check your height, weight, heart rate, and blood pressure. They will also look at your eyes and ears. They will ask about how you are feeling and whether you have any symptoms that bother you.   Medicines - It's a good idea to bring a list of all the medicines you take to each doctor visit. Your doctor will talk to you about your medicines and answer any questions. Tell them if you are having any side effects that bother you. You " "should also tell them if you are having trouble paying for any of your medicines.   Habits and behaviors - This includes:   Your diet   Your exercise habits   Whether you smoke, drink alcohol, or use drugs   Whether you are sexually active   Whether you feel safe at home  Your doctor will talk to you about things you can do to improve your health and lower your risk of health problems. They will also offer help and support. For example, if you want to quit smoking, they can give you advice and might prescribe medicines. If you want to improve your diet or get more physical activity, they can help you with this, too.   Lab tests, if needed - The tests you get will depend on your age and situation. For example, your doctor might want to check your:   Cholesterol   Blood sugar   Iron level   Vaccines - The recommended vaccines will depend on your age, health, and what vaccines you already had. Vaccines are very important because they can prevent certain serious or deadly infections.   Discussion of screening - \"Screening\" means checking for diseases or other health problems before they cause symptoms. Your doctor can recommend screening based on your age, risk, and preferences. This might include tests to check for:   Cancer, such as breast, prostate, cervical, ovarian, colorectal, prostate, lung, or skin cancer   Sexually transmitted infections, such as chlamydia and gonorrhea   Mental health conditions like depression and anxiety  Your doctor will talk to you about the different types of screening tests. They can help you decide which screenings to have. They can also explain what the results might mean.   Answering questions - The physical is a good time to ask the doctor or nurse questions about your health. If needed, they can refer you to other doctors or specialists, too.  Adults older than 65 years often need other care, too. As you get older, your doctor will talk to you about:   How to prevent falling at " home   Hearing or vision tests   Memory testing   How to take your medicines safely   Making sure that you have the help and support you need at home  All topics are updated as new evidence becomes available and our peer review process is complete.  This topic retrieved from [employer] on: May 02, 2024.  Topic 824192 Version 1.0  Release: 32.4.3 - C32.122  © 2024 UpToDate, Inc. and/or its affiliates. All rights reserved.  Consumer Information Use and Disclaimer   Disclaimer: This generalized information is a limited summary of diagnosis, treatment, and/or medication information. It is not meant to be comprehensive and should be used as a tool to help the user understand and/or assess potential diagnostic and treatment options. It does NOT include all information about conditions, treatments, medications, side effects, or risks that may apply to a specific patient. It is not intended to be medical advice or a substitute for the medical advice, diagnosis, or treatment of a health care provider based on the health care provider's examination and assessment of a patient's specific and unique circumstances. Patients must speak with a health care provider for complete information about their health, medical questions, and treatment options, including any risks or benefits regarding use of medications. This information does not endorse any treatments or medications as safe, effective, or approved for treating a specific patient. UpToDate, Inc. and its affiliates disclaim any warranty or liability relating to this information or the use thereof.The use of this information is governed by the Terms of Use, available at https://www.[employer]uwer.com/en/know/clinical-effectiveness-terms. 2024© UpToDate, Inc. and its affiliates and/or licensors. All rights reserved.  Copyright   © 2024 UpToDate, Inc. and/or its affiliates. All rights reserved.    Patient Education     Routine physical for adults   The Basics   Written by the  "doctors and editors at [facility]   What is a physical? -- A physical is a routine visit, or \"check-up,\" with your doctor. You might also hear it called a \"wellness visit\" or \"preventive visit.\"  During each visit, the doctor will:   Ask about your physical and mental health   Ask about your habits, behaviors, and lifestyle   Do an exam   Give you vaccines if needed   Talk to you about any medicines you take   Give advice about your health   Answer your questions  Getting regular check-ups is an important part of taking care of your health. It can help your doctor find and treat any problems you have. But it's also important for preventing health problems.  A routine physical is different from a \"sick visit.\" A sick visit is when you see a doctor because of a health concern or problem. Since physicals are scheduled ahead of time, you can think about what you want to ask the doctor.  How often should I get a physical? -- It depends on your age and health. In general, for people age 21 years and older:   If you are younger than 50 years, you might be able to get a physical every 3 years.   If you are 50 years or older, your doctor might recommend a physical every year.  If you have an ongoing health condition, like diabetes or high blood pressure, your doctor will probably want to see you more often.  What happens during a physical? -- In general, each visit will include:   Physical exam - The doctor or nurse will check your height, weight, heart rate, and blood pressure. They will also look at your eyes and ears. They will ask about how you are feeling and whether you have any symptoms that bother you.   Medicines - It's a good idea to bring a list of all the medicines you take to each doctor visit. Your doctor will talk to you about your medicines and answer any questions. Tell them if you are having any side effects that bother you. You should also tell them if you are having trouble paying for any of your " "medicines.   Habits and behaviors - This includes:   Your diet   Your exercise habits   Whether you smoke, drink alcohol, or use drugs   Whether you are sexually active   Whether you feel safe at home  Your doctor will talk to you about things you can do to improve your health and lower your risk of health problems. They will also offer help and support. For example, if you want to quit smoking, they can give you advice and might prescribe medicines. If you want to improve your diet or get more physical activity, they can help you with this, too.   Lab tests, if needed - The tests you get will depend on your age and situation. For example, your doctor might want to check your:   Cholesterol   Blood sugar   Iron level   Vaccines - The recommended vaccines will depend on your age, health, and what vaccines you already had. Vaccines are very important because they can prevent certain serious or deadly infections.   Discussion of screening - \"Screening\" means checking for diseases or other health problems before they cause symptoms. Your doctor can recommend screening based on your age, risk, and preferences. This might include tests to check for:   Cancer, such as breast, prostate, cervical, ovarian, colorectal, prostate, lung, or skin cancer   Sexually transmitted infections, such as chlamydia and gonorrhea   Mental health conditions like depression and anxiety  Your doctor will talk to you about the different types of screening tests. They can help you decide which screenings to have. They can also explain what the results might mean.   Answering questions - The physical is a good time to ask the doctor or nurse questions about your health. If needed, they can refer you to other doctors or specialists, too.  Adults older than 65 years often need other care, too. As you get older, your doctor will talk to you about:   How to prevent falling at home   Hearing or vision tests   Memory testing   How to take your " medicines safely   Making sure that you have the help and support you need at home  All topics are updated as new evidence becomes available and our peer review process is complete.  This topic retrieved from [employer] on: May 02, 2024.  Topic 134952 Version 1.0  Release: 32.4.3 - C32.122  © 2024 UpToDate, Inc. and/or its affiliates. All rights reserved.  Consumer Information Use and Disclaimer   Disclaimer: This generalized information is a limited summary of diagnosis, treatment, and/or medication information. It is not meant to be comprehensive and should be used as a tool to help the user understand and/or assess potential diagnostic and treatment options. It does NOT include all information about conditions, treatments, medications, side effects, or risks that may apply to a specific patient. It is not intended to be medical advice or a substitute for the medical advice, diagnosis, or treatment of a health care provider based on the health care provider's examination and assessment of a patient's specific and unique circumstances. Patients must speak with a health care provider for complete information about their health, medical questions, and treatment options, including any risks or benefits regarding use of medications. This information does not endorse any treatments or medications as safe, effective, or approved for treating a specific patient. UpToDate, Inc. and its affiliates disclaim any warranty or liability relating to this information or the use thereof.The use of this information is governed by the Terms of Use, available at https://www.w[employer]uwer.com/en/know/clinical-effectiveness-terms. 2024© UpToDate, Inc. and its affiliates and/or licensors. All rights reserved.  Copyright   © 2024 UpToDate, Inc. and/or its affiliates. All rights reserved.

## 2024-09-24 NOTE — ASSESSMENT & PLAN NOTE
- Notable yellowing, flaking, deformity of toenails b/l     Plan  - Continue treatment for 3 months   Orders:    terbinafine (LamISIL) 250 mg tablet; Take 1 tablet (250 mg total) by mouth daily

## 2024-09-24 NOTE — PROGRESS NOTES
Adult Annual Physical  Name: Son Lafleur      : 1970      MRN: [id number]  Encounter Provider: Felipe Kebede DO  Encounter Date: 2024   Encounter department: [facility]    Assessment & Plan  Annual physical exam  - Patient without any acute complaints or concerns  - Denies any recent illness or change in health status   - Misc findings on physical exam detailed below        Primary hypertension  - BP of 120/59 in office today  - Currently on amlodipine 10 mg QD, lisinopril 10 mg QD  - Stable    Plan  - Continue to monitor   - Advised patient to keep BP log for review   - Continue current medications        Type 2 diabetes mellitus without complication, without long-term current use of insulin (HCC)  - Stable   - A1C goal of <7%  Lab Results   Component Value Date    HGBA1C 6.5 2024   Plan  - Continue current medications     Orders:    Albumin / creatinine urine ratio; Future    Comprehensive metabolic panel; Future    Ambulatory Referral to Ophthalmology; Future    POCT hemoglobin A1c    Onychomycosis  - Notable yellowing, flaking, deformity of toenails b/l     Plan  - Continue treatment for 3 months   Orders:    terbinafine (LamISIL) 250 mg tablet; Take 1 tablet (250 mg total) by mouth daily    Pure hypercholesterolemia    Orders:    Lipoprotein A (LPA); Future    Screening for metabolic disorder    Orders:    Comprehensive metabolic panel; Future    TSH + Free T4; Future    Family history of heart attack    Orders:    Lipoprotein A (LPA); Future    Leukocytosis, unspecified type    Orders:    CBC and differential; Future    Need for hepatitis C screening test    Orders:    Hepatitis C antibody; Future    Encounter for screening for HIV    Orders:    HIV 1/2 AG/AB w Reflex [facility] for 2 yr old and above; Future    Concern for CECILIO (obstructive sleep apnea)    Orders:    Ambulatory Referral to Sleep Medicine; Future    Encounter for screening  colonoscopy    Orders:    Ambulatory Referral to Gastroenterology; Future    Prostate cancer screening    Orders:    PSA, Total Screen; Future    Lacunar infarct, acute (HCC)    Orders:    atorvastatin (LIPITOR) 40 mg tablet; Take 1 tablet (40 mg total) by mouth every evening    Hyperlipidemia    Orders:    atorvastatin (LIPITOR) 40 mg tablet; Take 1 tablet (40 mg total) by mouth every evening    Immunizations and preventive care screenings were discussed with patient today. Appropriate education was printed on patient's after visit summary.    Discussed risks and benefits of prostate cancer screening. We discussed the controversial history of PSA screening for prostate cancer in the [place] as well as the risk of over detection and over treatment of prostate cancer by way of PSA screening.  The patient understands that PSA blood testing is an imperfect way to screen for prostate cancer and that elevated PSA levels in the blood may also be caused by infection, inflammation, prostatic trauma or manipulation, urological procedures, or by benign prostatic enlargement.    The role of the digital rectal examination in prostate cancer screening was also discussed and I discussed with him that there is large interobserver variability in the findings of digital rectal examination.    Counseling:  Alcohol/drug use: discussed moderation in alcohol intake, the recommendations for healthy alcohol use, and avoidance of illicit drug use.  Injury prevention: discussed safety/seat belts, safety helmets, smoke detectors, carbon dioxide detectors, and smoking near bedding or upholstery.  Exercise: the importance of regular exercise/physical activity was discussed. Recommend exercise 3-5 times per week for at least 30 minutes.       Depression Screening and Follow-up Plan: Patient was screened for depression during today's encounter. They screened negative with a PHQ-2 score of 0.        History of Present Illness   54 yoM PMHx  of HTN, CVA, T2DM, family hx of CAD presents for annual physical. Patient is without any acute complaints or concerns. Patient denies any recent illness or change in health status.     Adult Annual Physical:  Patient presents for annual physical.     Diet and Physical Activity:  - Diet/Nutrition: poor diet and frequent junk food.  - Exercise: walking and no formal exercise.    Depression Screening:  - PHQ-2 Score: 0    General Health:  - Sleep: 7-8 hours of sleep on average, 4-6 hours of sleep on average and sleeps poorly.  - Hearing: normal hearing bilateral ears.  - Vision: vision problems.     Health:  - History of STDs: no.   - Urinary symptoms: urinary frequency.     Review of Systems   Constitutional:  Positive for fatigue. Negative for chills and fever.   HENT:  Negative for congestion and postnasal drip.    Eyes:  Positive for visual disturbance.   Respiratory:  Negative for chest tightness, shortness of breath and wheezing.    Cardiovascular:  Positive for leg swelling. Negative for chest pain and palpitations.   Gastrointestinal:  Negative for abdominal distention, abdominal pain, constipation, diarrhea, nausea and vomiting.   Endocrine: Negative for polydipsia and polyuria.   Genitourinary:  Positive for frequency. Negative for difficulty urinating, dysuria and penile discharge.   Musculoskeletal:  Positive for arthralgias (right knee).   Neurological:  Negative for dizziness, light-headedness and headaches.     Pertinent Medical History       Current Outpatient Medications on File Prior to Visit   Medication Sig Dispense Refill    Alcohol Swabs 70 % PADS May substitute brand based on insurance coverage. Check glucose BID. 100 each 0    aspirin 81 mg chewable tablet Chew 1 tablet (81 mg total) daily 30 tablet 5    Blood Glucose Monitoring Suppl (OneTouch Verio Reflect) w/Device KIT May substitute brand based on insurance coverage. Check glucose BID. 1 kit 0    Blood Pressure Monitoring (Blood Pressure  Cuff) MISC Use in the morning 1 each 0    glucose blood (OneTouch Verio) test strip May substitute brand based on insurance coverage. Check glucose BID. 100 each 0    OneTouch Delica Lancets 33G MISC May substitute brand based on insurance coverage. Check glucose BID. 100 each 0    albuterol (ProAir HFA) 90 mcg/act inhaler Inhale 2 puffs every 6 (six) hours as needed for shortness of breath (Patient not taking: Reported on 6/28/2024) 8.5 g 0    amLODIPine (NORVASC) 10 mg tablet Take 1 tablet (10 mg total) by mouth daily 30 tablet 5    benzonatate (TESSALON) 200 MG capsule Take 1 capsule (200 mg total) by mouth 3 (three) times a day as needed for cough (Patient not taking: Reported on 6/28/2024) 20 capsule 0    clopidogrel (PLAVIX) 75 mg tablet Take 1 tablet (75 mg total) by mouth daily for 18 doses 18 tablet 0    dexamethasone (DECADRON) 4 mg tablet Take 1 tablet (4 mg total) by mouth 2 (two) times a day with meals (Patient not taking: Reported on 6/28/2024) 6 tablet 0    diphenhydrAMINE (BENADRYL) 25 mg capsule Take 1 capsule by mouth every 6 (six) hours as needed for itching, allergies or sleep for up to 30 days 20 capsule 0    fluticasone (FLONASE) 50 mcg/act nasal spray 1 spray into each nostril daily for 30 days 1 Bottle 0    lisinopril (ZESTRIL) 10 mg tablet Take 1 tablet (10 mg total) by mouth daily 30 tablet 5    metFORMIN (GLUCOPHAGE) 500 mg tablet Take 1 tablet (500 mg total) by mouth 2 (two) times a day with meals 60 tablet 5     No current facility-administered medications on file prior to visit.        Objective   /59 (BP Location: Left arm, Patient Position: Sitting, Cuff Size: Standard)   Pulse 82   Temp 98 °F (36.7 °C)   Wt (!) 142 kg (314 lb)   SpO2 98%   BMI 45.05 kg/m²     Physical Exam  Vitals and nursing note reviewed.   Constitutional:       General: He is not in acute distress.     Appearance: Normal appearance. He is well-developed. He is obese. He is not ill-appearing.   HENT:       Head: Normocephalic and atraumatic.      Right Ear: External ear normal.      Left Ear: External ear normal.      Nose: Nose normal.   Eyes:      Conjunctiva/sclera: Conjunctivae normal.   Cardiovascular:      Rate and Rhythm: Normal rate and regular rhythm.      Pulses: no weak pulses.           Dorsalis pedis pulses are 2+ on the right side and 2+ on the left side.        Posterior tibial pulses are 1+ on the right side and 1+ on the left side.      Heart sounds: No murmur heard.  Pulmonary:      Effort: Pulmonary effort is normal. No respiratory distress.      Breath sounds: Normal breath sounds.   Abdominal:      Palpations: Abdomen is soft.      Tenderness: There is no abdominal tenderness.   Musculoskeletal:         General: No swelling.      Cervical back: Neck supple.      Right lower leg: Edema present.      Left lower leg: Edema present.   Feet:      Right foot:      Skin integrity: No ulcer, skin breakdown, erythema, warmth, callus or dry skin.      Left foot:      Skin integrity: No ulcer, skin breakdown, erythema, warmth, callus or dry skin.   Skin:     General: Skin is warm and dry.      Capillary Refill: Capillary refill takes less than 2 seconds.      Comments: Fungal infections of nails b/l    Neurological:      Mental Status: He is alert and oriented to person, place, and time.   Psychiatric:         Mood and Affect: Mood normal.       Patient's shoes and socks removed.    Right Foot/Ankle   Right Foot Inspection  Skin Exam: skin normal and skin intact. No dry skin, no warmth, no callus, no erythema, no maceration, no abnormal color, no pre-ulcer, no ulcer and no callus.     Toe Exam: ROM and strength within normal limits.     Sensory   Vibration: intact  Proprioception: intact  Monofilament testing: intact    Vascular  Capillary refills: < 3 seconds  The right DP pulse is 2+. The right PT pulse is 1+.     Right Toe  - Comprehensive Exam  Ecchymosis: none  Arch: normal  Claw Toes:  absent  Swelling: none   Tenderness: none       Left Foot/Ankle  Left Foot Inspection  Skin Exam: skin normal and skin intact. No dry skin, no warmth, no erythema, no maceration, normal color, no pre-ulcer, no ulcer and no callus.     Toe Exam: ROM and strength within normal limits.     Sensory   Vibration: intact  Proprioception: intact  Monofilament testing: intact    Vascular  Capillary refills: < 3 seconds  The left DP pulse is 2+. The left PT pulse is 1+.     Left Toe  - Comprehensive Exam  Ecchymosis: none  Arch: normal  Claw toes: absent  Swelling: none   Tenderness: none       Assign Risk Category  No deformity present  No loss of protective sensation  No weak pulses  Risk: 0

## 2024-09-30 NOTE — ASSESSMENT & PLAN NOTE
Orders:    atorvastatin (LIPITOR) 40 mg tablet; Take 1 tablet (40 mg total) by mouth every evening

## 2024-09-30 NOTE — PROGRESS NOTES
Adult Annual Physical  Name: Sno Lafleur      : 1970      MRN: [id number]  Encounter Provider: Felipe Kebede DO  Encounter Date: 2024   Encounter department: [facility]    Assessment & Plan  Annual physical exam           Primary hypertension           Type 2 diabetes mellitus without complication, without long-term current use of insulin (HCC)    Lab Results   Component Value Date    HGBA1C 6.5 2024     Orders:  •  Albumin / creatinine urine ratio; Future  •  Comprehensive metabolic panel; Future  •  Ambulatory Referral to Ophthalmology; Future  •  POCT hemoglobin A1c      Onychomycosis    Orders:  •  terbinafine (LamISIL) 250 mg tablet; Take 1 tablet (250 mg total) by mouth daily      Pure hypercholesterolemia    Orders:  •  Lipoprotein A (LPA); Future      Screening for metabolic disorder    Orders:  •  Comprehensive metabolic panel; Future  •  TSH + Free T4; Future      Family history of heart attack    Orders:  •  Lipoprotein A (LPA); Future      Leukocytosis, unspecified type    Orders:  •  CBC and differential; Future      Need for hepatitis C screening test    Orders:  •  Hepatitis C antibody; Future      Encounter for screening for HIV    Orders:  •  HIV 1/2 AG/AB w Reflex [facility] for 2 yr old and above; Future      Concern for CECILIO (obstructive sleep apnea)    Orders:  •  Ambulatory Referral to Sleep Medicine; Future      Encounter for screening colonoscopy    Orders:  •  Ambulatory Referral to Gastroenterology; Future      Prostate cancer screening    Orders:  •  PSA, Total Screen; Future      Lacunar infarct, acute (HCC)    Orders:  •  atorvastatin (LIPITOR) 40 mg tablet; Take 1 tablet (40 mg total) by mouth every evening      Hyperlipidemia    Orders:  •  atorvastatin (LIPITOR) 40 mg tablet; Take 1 tablet (40 mg total) by mouth every evening      Immunizations and preventive care screenings were discussed with patient today. Appropriate education was  printed on patient's after visit summary.    {Prostate cancer screening - consider in males between age of 40-75 depending on age, race, and risk factors. There is difference in the guidelines in regards to the optimal age for screening.  USPSTF states to consider periodic screening in males between the age of 50 to 69. This text is informational and does not need to be selected but if you wish, you can insert standard documentation in your progress note by using F2 (Optional):91260}    Counseling:  {Annual Physical; Counselin}         History of Present Illness   {Disappearing Hyperlinks I Encounters * My Last Note * Since Last Visit * History :24494}  Adult Annual Physical  Review of Systems  {Select to Display PMH (Optional):55132}    Objective   {Disappearing Hyperlinks   Review Vitals * Enter New Vitals * Results Review * Labs * Imaging * Cardiology * Procedures * Lung Cancer Screening * Surgical eConsent :43071}  /59 (BP Location: Left arm, Patient Position: Sitting, Cuff Size: Standard)   Pulse 82   Temp 98 °F (36.7 °C)   Wt (!) 142 kg (314 lb)   SpO2 98%   BMI 45.05 kg/m²     Physical Exam  {Administrative / Billing Section (Optional):92760}

## 2024-10-16 DIAGNOSIS — E78.5 HYPERLIPIDEMIA: ICD-10-CM

## 2024-10-16 DIAGNOSIS — I63.81 LACUNAR INFARCT, ACUTE (HCC): ICD-10-CM

## 2024-10-16 RX ORDER — ATORVASTATIN CALCIUM 40 MG/1
40 TABLET, FILM COATED ORAL EVERY EVENING
Qty: 90 TABLET | Refills: 2 | Status: SHIPPED | OUTPATIENT
Start: 2024-10-16

## 2025-01-03 ENCOUNTER — TELEPHONE (OUTPATIENT)
Dept: FAMILY MEDICINE CLINIC | Facility: CLINIC | Age: 55
End: 2025-01-03

## 2025-01-03 RX ORDER — TERBINAFINE HYDROCHLORIDE 250 MG/1
250 TABLET ORAL DAILY
Qty: 30 TABLET | OUTPATIENT
Start: 2025-01-03

## 2025-03-18 ENCOUNTER — TELEPHONE (OUTPATIENT)
Age: 55
End: 2025-03-18

## 2025-03-18 NOTE — TELEPHONE ENCOUNTER
[facility] 6/28-6/30/24  STROKE (CEREBRUM)    1ST ATTEMPT:    Called patient LVM to call office and schedule HFU.      Per Instructions:    Will need follow up with neurovascular attending/AP/resident in 6-8 weeks (60 min).

## 2025-03-25 NOTE — TELEPHONE ENCOUNTER
2nd Attempt,     Called pt no answer, LMOM.    Letter Sent.Patient doesn't have a Mychart so in this case letter will not be saved into chart but has been made , printed and mailed.      Thank you,     Bernarda HICKS/ STEPHANIE VALDIVIA/ STROKE    CT- HOME- 6/30/2025    Will need follow up with neurovascular attending/AP/resident in 6-8 weeks (60 min).

## 2025-05-02 ENCOUNTER — OFFICE VISIT (OUTPATIENT)
Dept: NEUROLOGY | Facility: CLINIC | Age: 55
End: 2025-05-02
Payer: COMMERCIAL

## 2025-05-02 VITALS
WEIGHT: 305 LBS | TEMPERATURE: 97 F | BODY MASS INDEX: 43.76 KG/M2 | SYSTOLIC BLOOD PRESSURE: 118 MMHG | OXYGEN SATURATION: 98 % | HEART RATE: 82 BPM | DIASTOLIC BLOOD PRESSURE: 82 MMHG

## 2025-05-02 DIAGNOSIS — E78.00 PURE HYPERCHOLESTEROLEMIA: ICD-10-CM

## 2025-05-02 DIAGNOSIS — E11.9 TYPE 2 DIABETES MELLITUS WITHOUT COMPLICATION, WITHOUT LONG-TERM CURRENT USE OF INSULIN (HCC): ICD-10-CM

## 2025-05-02 DIAGNOSIS — Z86.73 HISTORY OF CVA (CEREBROVASCULAR ACCIDENT): Primary | ICD-10-CM

## 2025-05-02 DIAGNOSIS — I10 PRIMARY HYPERTENSION: ICD-10-CM

## 2025-05-02 PROCEDURE — 99214 OFFICE O/P EST MOD 30 MIN: CPT | Performed by: PHYSICIAN ASSISTANT

## 2025-05-02 RX ORDER — ASPIRIN 81 MG/1
81 TABLET, CHEWABLE ORAL DAILY
Qty: 30 TABLET | Refills: 5 | Status: SHIPPED | OUTPATIENT
Start: 2025-05-02

## 2025-05-02 NOTE — PROGRESS NOTES
Name: Son Lafleur      : 1970      MRN: [id number]  Encounter Provider: Danelle Krause PA-C  Encounter Date: 2025   Encounter department: [facility] [place]  :  Assessment & Plan  History of CVA (cerebrovascular accident)  Patient presenting for hospital follow-up today for stroke which occurred nearly 1 year ago.    He presented with acute onset of right-sided weakness and dysarthria/expressive aphasia which lasted about 10 minutes before resolving on its own.  CTA head and neck with mild cervical carotid disease, severe tapering of the left supraclinoid ICA and markedly small carotid terminus as well as proximal CLAUDIA and MCA branches, severe focal stenosis at origin of the dominant right A1 segment, mild stenosis in the proximal right M1 segment, R >L PCA stenosis.  MRI brain demonstrated acute infarct left caudate body and chronic hemorrhagic infarct right anterior caudate body.  A1C 7.1, . Likely small vessel etiology.  Plan was for DAPT x 21 days then ASA 81mg monotherapy along with statin.  He was advised to establish with PCP as well.    He has not had any new neurologic symptoms to indicate recurrent TIA/CVA.  No residual deficits.    Unfortunately, he is not taking ASA.  He is not sure he even started this after discharge from the hospital.  He is taking atorvastatin, but nothing for BP or DM.  He has not had lipid panel rechecked since starting statin.    We discussed secondary stroke prevention in detail today.  Discussed need for antiplatelet therapy and statin therapy.  Need to add back ASA.    Discussed the importance of following regularly with his PCP for management of cardiovascular risk factors.  Goal blood pressure less than 130/80 routinely, goal LDL less than 70 and goal A1c less than 7.0.  He was advised to make an appointment with his PCP for follow-up and management of CV risk factors.     I am rechecking his lipid panel and LFTs since that has not  been checked since statin was initiated.  Ordering carotid doppler.        Plan:  - Restart aspirin 81 mg daily  - Continue atorvastatin 40 mg daily  - Patient should follow-up with his PCP for management of blood pressure, lipids and blood sugar  - Heart healthy diet and routine exercise as tolerated are advised  - Carotid ultrasound was ordered  - Signs and symptoms of stroke were discussed and included in the AVS today  - Follow-up in 1 year or sooner if needed      Orders:    Lipid panel; Future    Hepatic function panel; Future    VAS carotid complete study; Future    aspirin 81 mg chewable tablet; Chew 1 tablet (81 mg total) daily    Primary hypertension  BP appears at goal today, not taking any anti-hypertensives.  Defer management to PCP.  Goal <130/80        Type 2 diabetes mellitus without complication, without long-term current use of insulin (HCC)  He is no longer on metformin and does not check BS at home.  Advised following up with PCP  Lab Results   Component Value Date    HGBA1C 6.5 09/24/2024          Pure hypercholesterolemia  He is on statin.  Has not had lipid panel or LFTs checked since starting statin.  Ordering labs today.  Defer management to PCP.  Goal LDL >70  Orders:    Lipid panel; Future    Hepatic function panel; Future          History of Present Illness   HPI   Son Lafleur is a 55 y.o. male with no PMH previously (not following with a PCP regularly) who presents today for a hospital follow up.  The hospitalization occurred in June 2024 and for some reason he was just recently contacted to follow up.    Patient presented to the ED on 6/28/24 for stroke-like symptoms.  Patient was at work, eating lunch in the break room, and noticed he could not use the right side of his body (right arm > right leg weakness).  This was noted after his boss noticed he was having difficulty getting words out and dysarthria.  Symptoms started abruptly and lasted about 10 min.  In the ED, /141.   CTH/CTA with no LVO or flow limiting stenosis.  Subacute to chronic infarcts noted in right caudate head and adjacent pericaudate white matter.  No old cervical added atherosclerotic disease.  Severe tapering of the left supraclinoid ICA and markedly small carotid terminus as well as proximal CLAUDIA and MCA branches.  Severe focal stenosis at the origin of the dominant right A1 segment.. Mild stenosis in the proximal right M1 segment.  Right greater than left PCA stenosis. A1C 7.1, lipid panel with , .  ECHO with EF 55%, G1DD.  MRI brain with acute infarction in the left anterior caudate body. Chronic hemorrhagic infarction and regional gliosis in the right anterior caudate body.  Vessel.  He was started on both aspirin and Plavix with plan for DAPT x 21 days, then reduce to aspirin monotherapy as he was antiplatelet naïve prior.  He was started on Lipitor 40 mg daily along with antihypertensive medications (amlodipine and lisinopril).  He was also started on metformin.    Patient presents alone today.  He was confused as to why he was now being asked to follow-up from his stroke which occurred nearly 1 year prior.  Following his hospitalization in June 2024, he did see a PCP in September 2024.  He tells me that he is only taking atorvastatin 40 mg daily.  He is not sure if he ever took the antiplatelet therapies prescribed at discharge, and also not sure if he ever took the antihypertensive medications and metformin as prescribed.  Looks like he just had a physical with the PCP in September, no follow-up was scheduled.  He does not check his blood pressure or blood sugar at home.  He does let me know that he lost over 60 pounds and has been watching his diet (his sister is helping him with healthier food choices).  He works as a .  He denies any residual deficits from the stroke and no issues at work or at home with ADLs.  He drives, no issues.  There was some discussion about referring  "to sleep medicine for sleep apnea concerns.  He says he sleeps fairly well at night and has snored \"my whole life\".    Review of Systems   Constitutional: Negative.  Negative for chills, fatigue and fever.   HENT: Negative.  Negative for hearing loss, tinnitus and trouble swallowing.    Eyes:  Negative for photophobia, pain and visual disturbance.   Respiratory: Negative.  Negative for cough and shortness of breath.    Cardiovascular: Negative.  Negative for chest pain and palpitations.   Gastrointestinal:  Negative for constipation, diarrhea, nausea and vomiting.   Endocrine: Negative.    Genitourinary: Negative.  Negative for difficulty urinating and urgency.   Musculoskeletal: Negative.  Negative for gait problem.   Skin: Negative.  Negative for rash.   Neurological: Negative.  Negative for dizziness, tremors, seizures, weakness, numbness and headaches.   Hematological: Negative.    Psychiatric/Behavioral:  Negative for confusion and sleep disturbance.     I have personally reviewed the MA's review of systems and made changes as necessary.    Current Outpatient Medications on File Prior to Visit   Medication Sig Dispense Refill    Alcohol Swabs 70 % PADS May substitute brand based on insurance coverage. Check glucose BID. 100 each 0    atorvastatin (LIPITOR) 40 mg tablet TAKE 1 TABLET BY MOUTH EVERY DAY IN THE EVENING 90 tablet 2    glucose blood (OneTouch Verio) test strip May substitute brand based on insurance coverage. Check glucose BID. 100 each 0    OneTouch Delica Lancets 33G MISC May substitute brand based on insurance coverage. Check glucose BID. 100 each 0    albuterol (ProAir HFA) 90 mcg/act inhaler Inhale 2 puffs every 6 (six) hours as needed for shortness of breath (Patient not taking: Reported on 5/2/2025) 8.5 g 0    amLODIPine (NORVASC) 10 mg tablet Take 1 tablet (10 mg total) by mouth daily (Patient not taking: Reported on 5/2/2025) 30 tablet 5    benzonatate (TESSALON) 200 MG capsule Take 1 " capsule (200 mg total) by mouth 3 (three) times a day as needed for cough (Patient not taking: Reported on 5/2/2025) 20 capsule 0    Blood Glucose Monitoring Suppl (OneTouch Verio Reflect) w/Device KIT May substitute brand based on insurance coverage. Check glucose BID. (Patient not taking: Reported on 5/2/2025) 1 kit 0    Blood Pressure Monitoring (Blood Pressure Cuff) MISC Use in the morning (Patient not taking: Reported on 5/2/2025) 1 each 0    dexamethasone (DECADRON) 4 mg tablet Take 1 tablet (4 mg total) by mouth 2 (two) times a day with meals (Patient not taking: Reported on 5/2/2025) 6 tablet 0    diphenhydrAMINE (BENADRYL) 25 mg capsule Take 1 capsule by mouth every 6 (six) hours as needed for itching, allergies or sleep for up to 30 days 20 capsule 0    fluticasone (FLONASE) 50 mcg/act nasal spray 1 spray into each nostril daily for 30 days 1 Bottle 0    lisinopril (ZESTRIL) 10 mg tablet Take 1 tablet (10 mg total) by mouth daily (Patient not taking: Reported on 5/2/2025) 30 tablet 5    metFORMIN (GLUCOPHAGE) 500 mg tablet Take 1 tablet (500 mg total) by mouth 2 (two) times a day with meals (Patient not taking: Reported on 5/2/2025) 60 tablet 5    [DISCONTINUED] aspirin 81 mg chewable tablet Chew 1 tablet (81 mg total) daily (Patient not taking: Reported on 5/2/2025) 30 tablet 5    [DISCONTINUED] clopidogrel (PLAVIX) 75 mg tablet Take 1 tablet (75 mg total) by mouth daily for 18 doses 18 tablet 0     No current facility-administered medications on file prior to visit.         Objective   /82 (BP Location: Left arm, Patient Position: Sitting, Cuff Size: Adult)   Pulse 82   Temp (!) 97 °F (36.1 °C) (Temporal)   Wt (!) 138 kg (305 lb)   SpO2 98%   BMI 43.76 kg/m²     Physical Exam  Constitutional:       Appearance: Normal appearance.   Eyes:      Extraocular Movements: EOM normal.      Pupils: Pupils are equal, round, and reactive to light.   Neurological:      Mental Status: He is alert.       Motor: Motor strength is normal.  Psychiatric:         Mood and Affect: Mood normal.         Speech: Speech normal.         Behavior: Behavior normal.       Neurological Exam  Mental Status  Alert. Oriented to person, place, time and situation. Speech is normal. Language is fluent with no aphasia. Attention and concentration are normal.    Cranial Nerves  CN II: Visual fields full to confrontation.  CN III, IV, VI: Extraocular movements intact bilaterally. Pupils equal round and reactive to light bilaterally.  CN V: Facial sensation is normal.  CN VII: Full and symmetric facial movement.  CN VIII: Hearing is normal.  CN IX, X: Palate elevates symmetrically  CN XI: Shoulder shrug strength is normal.  CN XII: Tongue midline without atrophy or fasciculations.    Motor   Normal muscle tone. Strength is 5/5 throughout all four extremities.    Sensory  Light touch is normal in upper and lower extremities.     Coordination  Right: Finger-to-nose normal.Left: Finger-to-nose normal.    Gait  Casual gait is normal including stance, stride, and arm swing.

## 2025-05-02 NOTE — ASSESSMENT & PLAN NOTE
He is no longer on metformin and does not check BS at home.  Advised following up with PCP  Lab Results   Component Value Date    HGBA1C 6.5 09/24/2024

## 2025-05-02 NOTE — PATIENT INSTRUCTIONS
Please resume aspirin 81mg daily.  I sent a new prescription to your pharmacy but this can also be purchased over the counter   Continue Lipitor (atorvastatin) 40mg daily  Continue to follow with your PCP regularly for ongoing management of blood pressure, cholesterol and blood sugar  Need to recheck your lipid panel since this has not been checked since starting the atorvastatin   Goal blood pressure is less than 130/80 routinely   Will check an ultrasound of the carotid arteries   Recommend a heart healthy diet and routine exercise as tolerated  Follow up in 1 year or sooner if needed    If you experience any facial droop, weakness on one side of the body, speech or swallowing difficulty, painless loss of vision in one eye, double vision, vertigo that does not resolve quickly/imbalance, go to the ER/call 911.

## 2025-05-02 NOTE — ASSESSMENT & PLAN NOTE
Patient presenting for hospital follow-up today for stroke which occurred nearly 1 year ago.    He presented with acute onset of right-sided weakness and dysarthria/expressive aphasia which lasted about 10 minutes before resolving on its own.  CTA head and neck with mild cervical carotid disease, severe tapering of the left supraclinoid ICA and markedly small carotid terminus as well as proximal CLAUDIA and MCA branches, severe focal stenosis at origin of the dominant right A1 segment, mild stenosis in the proximal right M1 segment, R >L PCA stenosis.  MRI brain demonstrated acute infarct left caudate body and chronic hemorrhagic infarct right anterior caudate body.  A1C 7.1, . Likely small vessel etiology.  Plan was for DAPT x 21 days then ASA 81mg monotherapy along with statin.  He was advised to establish with PCP as well.    He has not had any new neurologic symptoms to indicate recurrent TIA/CVA.  No residual deficits.    Unfortunately, he is not taking ASA.  He is not sure he even started this after discharge from the hospital.  He is taking atorvastatin, but nothing for BP or DM.  He has not had lipid panel rechecked since starting statin.    We discussed secondary stroke prevention in detail today.  Discussed need for antiplatelet therapy and statin therapy.  Need to add back ASA.    Discussed the importance of following regularly with his PCP for management of cardiovascular risk factors.  Goal blood pressure less than 130/80 routinely, goal LDL less than 70 and goal A1c less than 7.0.  He was advised to make an appointment with his PCP for follow-up and management of CV risk factors.     I am rechecking his lipid panel and LFTs since that has not been checked since statin was initiated.  Ordering carotid doppler.        Plan:  - Restart aspirin 81 mg daily  - Continue atorvastatin 40 mg daily  - Patient should follow-up with his PCP for management of blood pressure, lipids and blood sugar  - Heart  healthy diet and routine exercise as tolerated are advised  - Carotid ultrasound was ordered  - Signs and symptoms of stroke were discussed and included in the AVS today  - Follow-up in 1 year or sooner if needed      Orders:    Lipid panel; Future    Hepatic function panel; Future    VAS carotid complete study; Future    aspirin 81 mg chewable tablet; Chew 1 tablet (81 mg total) daily

## 2025-05-02 NOTE — ASSESSMENT & PLAN NOTE
BP appears at goal today, not taking any anti-hypertensives.  Defer management to PCP.  Goal <130/80

## 2025-05-02 NOTE — ASSESSMENT & PLAN NOTE
He is on statin.  Has not had lipid panel or LFTs checked since starting statin.  Ordering labs today.  Defer management to PCP.  Goal LDL >70  Orders:    Lipid panel; Future    Hepatic function panel; Future

## 2025-05-05 ENCOUNTER — HOSPITAL ENCOUNTER (OUTPATIENT)
Dept: NON INVASIVE DIAGNOSTICS | Facility: HOSPITAL | Age: 55
Discharge: HOME | End: 2025-05-05
Attending: PHYSICIAN ASSISTANT
Payer: COMMERCIAL

## 2025-05-05 DIAGNOSIS — Z86.73 HISTORY OF CVA (CEREBROVASCULAR ACCIDENT): ICD-10-CM

## 2025-05-05 PROCEDURE — 93880 EXTRACRANIAL BILAT STUDY: CPT | Performed by: SURGERY

## 2025-05-05 PROCEDURE — 93880 EXTRACRANIAL BILAT STUDY: CPT

## 2025-05-06 ENCOUNTER — RESULTS FOLLOW-UP (OUTPATIENT)
Dept: NEUROLOGY | Facility: CLINIC | Age: 55
End: 2025-05-06

## 2025-05-14 NOTE — TELEPHONE ENCOUNTER
Danelle Krause PA-C to Neurology Multiple Sclerosis Team 3 (Selected Message)     5/6/25  1:55 PM  Result Note    Please let patient know that carotid ultrasound with less than 50% stenosis bilaterally.  This should be repeated in 1 year.  I am going to see him back in 1 year and I can order the study at that time.  His PCP can then take over monitoring this from there.  Please again remind him to make a follow-up visit with his PCP for management of blood pressure, lipids and blood sugar  VAS carotid complete study

## 2025-07-27 ENCOUNTER — APPOINTMENT (EMERGENCY)
Dept: CT IMAGING | Facility: HOSPITAL | Age: 55
End: 2025-07-27
Payer: COMMERCIAL

## 2025-07-27 ENCOUNTER — HOSPITAL ENCOUNTER (INPATIENT)
Facility: HOSPITAL | Age: 55
LOS: 2 days | Discharge: HOME | End: 2025-07-29
Attending: EMERGENCY MEDICINE | Admitting: INTERNAL MEDICINE
Payer: COMMERCIAL

## 2025-07-27 PROBLEM — E66.01 MORBID OBESITY DUE TO EXCESS CALORIES (HCC): Status: ACTIVE | Noted: 2025-07-27

## 2025-07-27 PROBLEM — I63.9 CEREBROVASCULAR ACCIDENT (CVA) DUE TO VASCULAR OCCLUSION (HCC): Status: ACTIVE | Noted: 2025-07-27

## 2025-07-28 ENCOUNTER — APPOINTMENT (INPATIENT)
Dept: MRI IMAGING | Facility: HOSPITAL | Age: 55
End: 2025-07-28
Payer: COMMERCIAL

## 2025-07-28 ENCOUNTER — TELEPHONE (OUTPATIENT)
Age: 55
End: 2025-07-28

## 2025-07-28 ENCOUNTER — APPOINTMENT (INPATIENT)
Dept: NON INVASIVE DIAGNOSTICS | Facility: HOSPITAL | Age: 55
End: 2025-07-28
Payer: COMMERCIAL

## 2025-07-28 DIAGNOSIS — E78.5 HYPERLIPIDEMIA: ICD-10-CM

## 2025-07-28 DIAGNOSIS — I63.81 LACUNAR INFARCT, ACUTE (HCC): ICD-10-CM

## 2025-07-28 PROBLEM — R29.90 STROKE-LIKE SYMPTOMS: Status: ACTIVE | Noted: 2025-07-27

## 2025-07-30 ENCOUNTER — TRANSITIONAL CARE MANAGEMENT (OUTPATIENT)
Dept: FAMILY MEDICINE CLINIC | Facility: CLINIC | Age: 55
End: 2025-07-30

## 2025-07-30 ENCOUNTER — TELEPHONE (OUTPATIENT)
Dept: FAMILY MEDICINE CLINIC | Facility: CLINIC | Age: 55
End: 2025-07-30

## 2025-07-30 RX ORDER — ATORVASTATIN CALCIUM 40 MG/1
80 TABLET, FILM COATED ORAL EVERY EVENING
Qty: 90 TABLET | Refills: 0 | Status: SHIPPED | OUTPATIENT
Start: 2025-07-30

## 2025-07-31 ENCOUNTER — TELEPHONE (OUTPATIENT)
Dept: FAMILY MEDICINE CLINIC | Facility: CLINIC | Age: 55
End: 2025-07-31

## 2025-08-05 ENCOUNTER — TELEPHONE (OUTPATIENT)
Dept: NEUROLOGY | Facility: CLINIC | Age: 55
End: 2025-08-05

## 2025-08-08 ENCOUNTER — OFFICE VISIT (OUTPATIENT)
Dept: FAMILY MEDICINE CLINIC | Facility: CLINIC | Age: 55
End: 2025-08-08
Payer: COMMERCIAL

## 2025-08-20 DIAGNOSIS — I15.8 OTHER SECONDARY HYPERTENSION: Primary | ICD-10-CM

## 2025-08-20 RX ORDER — LOSARTAN POTASSIUM 50 MG/1
50 TABLET ORAL DAILY
Qty: 30 TABLET | Refills: 2 | Status: SHIPPED | OUTPATIENT
Start: 2025-08-20